# Patient Record
Sex: FEMALE | Race: BLACK OR AFRICAN AMERICAN | NOT HISPANIC OR LATINO | Employment: OTHER | URBAN - METROPOLITAN AREA
[De-identification: names, ages, dates, MRNs, and addresses within clinical notes are randomized per-mention and may not be internally consistent; named-entity substitution may affect disease eponyms.]

---

## 2017-01-04 RX ORDER — PIOGLITAZONEHYDROCHLORIDE 45 MG/1
TABLET ORAL
Qty: 90 TABLET | Refills: 2 | Status: SHIPPED | OUTPATIENT
Start: 2017-01-04 | End: 2017-03-27

## 2017-02-09 DIAGNOSIS — E11.9 DIABETES MELLITUS TYPE 2, NONINSULIN DEPENDENT (HCC): Primary | ICD-10-CM

## 2017-02-09 RX ORDER — GLIPIZIDE 10 MG/1
10 TABLET, FILM COATED, EXTENDED RELEASE ORAL DAILY
Qty: 90 TABLET | Refills: 3 | Status: SHIPPED | OUTPATIENT
Start: 2017-02-09 | End: 2018-01-15 | Stop reason: SDUPTHER

## 2017-02-09 RX ORDER — LEVOTHYROXINE SODIUM 0.07 MG/1
75 TABLET ORAL DAILY
Qty: 90 TABLET | Refills: 3 | Status: SHIPPED | OUTPATIENT
Start: 2017-02-09 | End: 2017-03-28 | Stop reason: DRUGHIGH

## 2017-03-27 ENCOUNTER — OFFICE VISIT (OUTPATIENT)
Dept: INTERNAL MEDICINE | Facility: CLINIC | Age: 67
End: 2017-03-27

## 2017-03-27 VITALS
DIASTOLIC BLOOD PRESSURE: 82 MMHG | WEIGHT: 293 LBS | SYSTOLIC BLOOD PRESSURE: 140 MMHG | HEART RATE: 67 BPM | HEIGHT: 69 IN | OXYGEN SATURATION: 98 % | BODY MASS INDEX: 43.4 KG/M2

## 2017-03-27 DIAGNOSIS — E11.9 DIABETES MELLITUS TYPE 2, NONINSULIN DEPENDENT (HCC): Primary | ICD-10-CM

## 2017-03-27 DIAGNOSIS — I10 ESSENTIAL HYPERTENSION: ICD-10-CM

## 2017-03-27 DIAGNOSIS — E03.9 ADULT HYPOTHYROIDISM: ICD-10-CM

## 2017-03-27 DIAGNOSIS — D64.9 CHRONIC ANEMIA: ICD-10-CM

## 2017-03-27 LAB
ALBUMIN SERPL-MCNC: 3.45 G/DL (ref 3.4–4.6)
ALBUMIN/GLOB SERPL: 0.8 G/DL
ALP SERPL-CCNC: 90 U/L (ref 46–116)
ALT SERPL W P-5'-P-CCNC: 17 U/L (ref 14–59)
ANION GAP SERPL CALCULATED.3IONS-SCNC: 8 MMOL/L
AST SERPL-CCNC: 15 U/L (ref 7–37)
BACTERIA UR QL AUTO: ABNORMAL /HPF
BASOPHILS # BLD AUTO: 0.01 10*3/MM3 (ref 0–0.2)
BASOPHILS NFR BLD AUTO: 0.2 % (ref 0–2)
BILIRUB SERPL-MCNC: 0.2 MG/DL (ref 0.2–1)
BILIRUB UR QL STRIP: NEGATIVE
BUN BLD-MCNC: 11 MG/DL (ref 6–22)
BUN/CREAT SERPL: 10.3 (ref 7–25)
CALCIUM SPEC-SCNC: 10.2 MG/DL (ref 8.6–10.5)
CHLORIDE SERPL-SCNC: 105 MMOL/L (ref 95–107)
CLARITY UR: CLEAR
CO2 SERPL-SCNC: 30 MMOL/L (ref 23–32)
COLOR UR: YELLOW
CREAT BLD-MCNC: 1.07 MG/DL (ref 0.55–1.02)
DEPRECATED RDW RBC AUTO: 44 FL (ref 37–54)
EOSINOPHIL # BLD AUTO: 0.09 10*3/MM3 (ref 0–0.7)
EOSINOPHIL NFR BLD AUTO: 1.6 % (ref 0–5)
ERYTHROCYTE [DISTWIDTH] IN BLOOD BY AUTOMATED COUNT: 13.4 % (ref 11.5–15)
GFR SERPL CREATININE-BSD FRML MDRD: 62 ML/MIN/1.73
GLOBULIN UR ELPH-MCNC: 4.2 GM/DL
GLUCOSE BLD-MCNC: 119 MG/DL (ref 70–100)
GLUCOSE UR STRIP-MCNC: NEGATIVE MG/DL
HBA1C MFR BLD: 7 % (ref 4–6)
HCT VFR BLD AUTO: 38.9 % (ref 34.1–44.9)
HGB BLD-MCNC: 11.8 G/DL (ref 11.2–15.7)
HGB UR QL STRIP.AUTO: ABNORMAL
HYALINE CASTS UR QL AUTO: ABNORMAL /LPF
KETONES UR QL STRIP: NEGATIVE
LEUKOCYTE ESTERASE UR QL STRIP.AUTO: ABNORMAL
LYMPHOCYTES # BLD AUTO: 1.69 10*3/MM3 (ref 0.8–7)
LYMPHOCYTES NFR BLD AUTO: 29.6 % (ref 10–60)
MCH RBC QN AUTO: 27.7 PG (ref 26–34)
MCHC RBC AUTO-ENTMCNC: 30.3 G/DL (ref 31–37)
MCV RBC AUTO: 91.3 FL (ref 80–100)
MONOCYTES # BLD AUTO: 0.42 10*3/MM3 (ref 0–1)
MONOCYTES NFR BLD AUTO: 7.4 % (ref 0–13)
MUCOUS THREADS URNS QL MICRO: ABNORMAL /HPF
NEUTROPHILS # BLD AUTO: 3.49 10*3/MM3 (ref 1–11)
NEUTROPHILS NFR BLD AUTO: 61.2 % (ref 30–85)
NITRITE UR QL STRIP: NEGATIVE
PH UR STRIP.AUTO: 5.5 [PH] (ref 5–8)
PLATELET # BLD AUTO: 319 10*3/MM3 (ref 150–450)
PMV BLD AUTO: 10.9 FL (ref 6–12)
POTASSIUM BLD-SCNC: 5 MMOL/L (ref 3.3–5.3)
PROT SERPL-MCNC: 7.6 G/DL (ref 6.3–8.4)
PROT UR QL STRIP: NEGATIVE
RBC # BLD AUTO: 4.26 10*6/MM3 (ref 3.93–5.22)
RBC # UR: ABNORMAL /HPF
REF LAB TEST METHOD: ABNORMAL
SODIUM BLD-SCNC: 143 MMOL/L (ref 136–145)
SP GR UR STRIP: >=1.03 (ref 1–1.03)
SQUAMOUS #/AREA URNS HPF: ABNORMAL /HPF
TSH SERPL DL<=0.05 MIU/L-ACNC: 0.2 MIU/ML (ref 0.4–4.2)
UROBILINOGEN UR QL STRIP: ABNORMAL
WBC NRBC COR # BLD: 5.7 10*3/MM3 (ref 5–10)
WBC UR QL AUTO: ABNORMAL /HPF

## 2017-03-27 PROCEDURE — 80053 COMPREHEN METABOLIC PANEL: CPT | Performed by: INTERNAL MEDICINE

## 2017-03-27 PROCEDURE — 84443 ASSAY THYROID STIM HORMONE: CPT | Performed by: INTERNAL MEDICINE

## 2017-03-27 PROCEDURE — 99213 OFFICE O/P EST LOW 20 MIN: CPT | Performed by: INTERNAL MEDICINE

## 2017-03-27 PROCEDURE — 85025 COMPLETE CBC W/AUTO DIFF WBC: CPT | Performed by: INTERNAL MEDICINE

## 2017-03-27 PROCEDURE — 36415 COLL VENOUS BLD VENIPUNCTURE: CPT | Performed by: INTERNAL MEDICINE

## 2017-03-27 PROCEDURE — 83036 HEMOGLOBIN GLYCOSYLATED A1C: CPT | Performed by: INTERNAL MEDICINE

## 2017-03-27 PROCEDURE — 81001 URINALYSIS AUTO W/SCOPE: CPT | Performed by: INTERNAL MEDICINE

## 2017-03-27 NOTE — PROGRESS NOTES
Newton Peoples is a 66 y.o. female.     History of Present Illness she is here today for follow-up of hypertension and diabetes mellitus.  Generally she has done well and her fasting blood sugar range is from 70s to 120s.  She has not had an eye exam in the last year however.  She denied any dizziness or focal neurologic symptoms.  There has been no chest pain, dyspnea on exertion, or PND.    Review of Systems   Constitutional: Negative for activity change, appetite change and fatigue.   Respiratory: Negative for cough, chest tightness, shortness of breath and wheezing.    Cardiovascular: Negative for chest pain, palpitations and leg swelling.   Gastrointestinal: Negative for abdominal distention, diarrhea, nausea and vomiting.   Genitourinary: Negative for flank pain and hematuria.   Musculoskeletal: Negative for arthralgias, back pain and gait problem.   Neurological: Negative for dizziness, syncope, facial asymmetry, speech difficulty, weakness, numbness and headaches.   Psychiatric/Behavioral: Negative for confusion and dysphoric mood. The patient is not nervous/anxious.      Objective   Physical Exam   Constitutional: She is oriented to person, place, and time. She appears well-developed and well-nourished. She is active. She does not appear ill.   Eyes: Conjunctivae are normal.   Neck: Carotid bruit is not present. Thyromegaly present. No thyroid mass present.       Cardiovascular: Normal rate, regular rhythm, S1 normal and S2 normal.  Exam reveals no S3 and no S4.    No murmur heard.  Pulses:       Posterior tibial pulses are 2+ on the right side, and 2+ on the left side.   Pulmonary/Chest: No tachypnea. No respiratory distress. She has no decreased breath sounds. She has no wheezes. She has no rhonchi. She has no rales.   Abdominal: Soft. Normal appearance and bowel sounds are normal. She exhibits no abdominal bruit and no mass. There is no hepatosplenomegaly. There is no tenderness.        Vascular Status -  Her exam exhibits no right foot edema. Her exam exhibits no left foot edema.  Neurological: She is alert and oriented to person, place, and time. Gait normal.   Psychiatric: She has a normal mood and affect. Her speech is normal and behavior is normal. Judgment and thought content normal. Cognition and memory are normal.       Assessment/Plan assessment #1 hypertension-systolic borderline today #2 diabetes mellitus-apparent good control    Plan #1 no change medication #2 CBC, CMP, urinalysis, TSH, hemoglobin A1c today.  Routine follow-up with me in 4 months.

## 2017-03-28 RX ORDER — LEVOTHYROXINE SODIUM 0.05 MG/1
50 TABLET ORAL DAILY
Qty: 90 TABLET | Refills: 1 | Status: SHIPPED | OUTPATIENT
Start: 2017-03-28 | End: 2017-07-17 | Stop reason: SDUPTHER

## 2017-04-24 RX ORDER — ATENOLOL 25 MG/1
TABLET ORAL
Qty: 90 TABLET | Refills: 1 | Status: SHIPPED | OUTPATIENT
Start: 2017-04-24 | End: 2017-10-24 | Stop reason: SDUPTHER

## 2017-04-27 DIAGNOSIS — E11.9 DIABETES MELLITUS TYPE 2, NONINSULIN DEPENDENT (HCC): Primary | ICD-10-CM

## 2017-05-19 ENCOUNTER — TELEPHONE (OUTPATIENT)
Dept: INTERNAL MEDICINE | Facility: CLINIC | Age: 67
End: 2017-05-19

## 2017-05-19 DIAGNOSIS — N39.0 URINARY TRACT INFECTION, SITE UNSPECIFIED: Primary | ICD-10-CM

## 2017-06-01 ENCOUNTER — TELEPHONE (OUTPATIENT)
Dept: INTERNAL MEDICINE | Facility: CLINIC | Age: 67
End: 2017-06-01

## 2017-06-01 RX ORDER — LISINOPRIL 40 MG/1
TABLET ORAL
Qty: 90 TABLET | Refills: 3 | Status: SHIPPED | OUTPATIENT
Start: 2017-06-01 | End: 2018-08-21 | Stop reason: SDUPTHER

## 2017-06-01 RX ORDER — OXYBUTYNIN CHLORIDE 5 MG/1
TABLET ORAL
Qty: 90 TABLET | Refills: 3 | Status: SHIPPED | OUTPATIENT
Start: 2017-06-01 | End: 2018-08-21 | Stop reason: SDUPTHER

## 2017-06-01 NOTE — TELEPHONE ENCOUNTER
----- Message from Alva Mark sent at 6/1/2017  3:52 PM EDT -----  Contact: pt - Dr Olivares's pt - RE: Rx refill  Pt calling and would like a 10 day supply called to pharmacy. Pt informs was on vacation and does not have enough Rx to last til renewal. Could you please call Rx to pt's pharmacy? Please advise. Thanks    oxybutynin (DITROPAN) 5 MG tablet 90 tablet 2 7/5/2016      Sig: TAKE 1 TABLET AT BEDTIME    lisinopril (PRINIVIL,ZESTRIL) 40 MG tablet 90 tablet 2 7/5/2016      Sig: TAKE 1 TABLET DAILY    [facility] [address] [place] [address] - [address] [place] [address] AT [address] [place] [address] - [phone]  - [phone] (Phone)  [phone] (Fax)    Pt # [phone]

## 2017-06-05 ENCOUNTER — LAB (OUTPATIENT)
Dept: INTERNAL MEDICINE | Facility: CLINIC | Age: 67
End: 2017-06-05

## 2017-06-05 DIAGNOSIS — N39.0 URINARY TRACT INFECTION, SITE UNSPECIFIED: ICD-10-CM

## 2017-06-09 ENCOUNTER — TELEPHONE (OUTPATIENT)
Dept: INTERNAL MEDICINE | Facility: CLINIC | Age: 67
End: 2017-06-09

## 2017-06-09 LAB
BACTERIA UR CULT: ABNORMAL
Lab: ABNORMAL
SUSCEPTIBILITY TESTING: ABNORMAL

## 2017-06-09 RX ORDER — SULFAMETHOXAZOLE AND TRIMETHOPRIM 800; 160 MG/1; MG/1
1 TABLET ORAL 2 TIMES DAILY
Qty: 10 TABLET | Refills: 0 | Status: SHIPPED | OUTPATIENT
Start: 2017-06-09 | End: 2017-08-21

## 2017-06-09 NOTE — TELEPHONE ENCOUNTER
----- Message from Ryan Olivares Jr., MD sent at 6/9/2017  8:48 AM EDT -----  Urine culture is positive.  Bactrim double strength 1 by mouth twice a day for 5 days.

## 2017-07-17 DIAGNOSIS — E03.9 ADULT HYPOTHYROIDISM: Primary | ICD-10-CM

## 2017-07-17 RX ORDER — LEVOTHYROXINE SODIUM 0.05 MG/1
50 TABLET ORAL DAILY
Qty: 90 TABLET | Refills: 3 | Status: SHIPPED | OUTPATIENT
Start: 2017-07-17 | End: 2018-09-10 | Stop reason: SDUPTHER

## 2017-07-18 ENCOUNTER — TELEPHONE (OUTPATIENT)
Dept: INTERNAL MEDICINE | Facility: CLINIC | Age: 67
End: 2017-07-18

## 2017-07-18 NOTE — TELEPHONE ENCOUNTER
----- Message from Latoya Shepard sent at 7/17/2017  3:43 PM EDT -----  Contact: pt  Pt calling would like a call back. She needs help with the directions on her medication     Doxepin Hydrochloride 5% cream.     I did not see this in her chart. She says she got it through mail order. Please advise.     #[phone]

## 2017-07-18 NOTE — TELEPHONE ENCOUNTER
The Doxepin is being used for her lower back pain, hip pain and leg pain.    There is a tube of medication and a pump of medication.  There are steps to follow.  She does not understand the steps of using the tube and pump of medications.

## 2017-07-20 RX ORDER — TRAMADOL HYDROCHLORIDE 50 MG/1
50 TABLET ORAL 2 TIMES DAILY
Qty: 45 TABLET | Refills: 0 | Status: SHIPPED | OUTPATIENT
Start: 2017-07-20 | End: 2018-02-07 | Stop reason: SDUPTHER

## 2017-08-21 ENCOUNTER — OFFICE VISIT (OUTPATIENT)
Dept: INTERNAL MEDICINE | Facility: CLINIC | Age: 67
End: 2017-08-21

## 2017-08-21 VITALS
WEIGHT: 293 LBS | SYSTOLIC BLOOD PRESSURE: 134 MMHG | HEIGHT: 69 IN | BODY MASS INDEX: 43.4 KG/M2 | DIASTOLIC BLOOD PRESSURE: 76 MMHG

## 2017-08-21 DIAGNOSIS — Z12.31 ENCOUNTER FOR MAMMOGRAM TO ESTABLISH BASELINE MAMMOGRAM: ICD-10-CM

## 2017-08-21 DIAGNOSIS — I10 ESSENTIAL HYPERTENSION: ICD-10-CM

## 2017-08-21 DIAGNOSIS — M13.0 HYPERTROPHIC POLYARTHRITIS: ICD-10-CM

## 2017-08-21 DIAGNOSIS — E03.9 ADULT HYPOTHYROIDISM: ICD-10-CM

## 2017-08-21 DIAGNOSIS — E04.0 DIFFUSE GOITER: Primary | ICD-10-CM

## 2017-08-21 DIAGNOSIS — D64.9 CHRONIC ANEMIA: ICD-10-CM

## 2017-08-21 DIAGNOSIS — E11.9 DIABETES MELLITUS TYPE 2, NONINSULIN DEPENDENT (HCC): ICD-10-CM

## 2017-08-21 LAB
HBA1C MFR BLD: 6.3 % (ref 4–6)
TSH SERPL-ACNC: 0.41 MIU/ML (ref 0.27–4.2)

## 2017-08-21 PROCEDURE — 99214 OFFICE O/P EST MOD 30 MIN: CPT | Performed by: INTERNAL MEDICINE

## 2017-08-21 PROCEDURE — 83036 HEMOGLOBIN GLYCOSYLATED A1C: CPT | Performed by: INTERNAL MEDICINE

## 2017-08-21 RX ORDER — PIOGLITAZONEHYDROCHLORIDE 45 MG/1
TABLET ORAL
COMMUNITY
Start: 2017-07-03 | End: 2017-10-01 | Stop reason: SDUPTHER

## 2017-08-21 RX ORDER — DOXEPIN HYDROCHLORIDE 50 MG/G
CREAM TOPICAL
COMMUNITY
Start: 2017-07-13 | End: 2018-06-25

## 2017-08-21 NOTE — PROGRESS NOTES
Subjective   Terra Peoples is a 67 y.o. female.     History of Present Illness she is here today for her yearly follow-up which includes hypertension, diabetes mellitus, generalized osteoarthritis and hypothyroidism with goiter.  Overall she has felt well.  She denies any dizziness, focal neurologic symptoms, PND, dyspnea on exertion, chest pain, or swelling in the legs.  Her nocturia has improved with the use of oxybutynin.  She uses topical Voltaren and baclofen for diffuse osteoarthritis with good relief.  Her appetite is good.  Her review systems is otherwise negative.        Review of Systems   Constitutional: Negative for activity change, appetite change, fatigue and unexpected weight change.   HENT: Negative for trouble swallowing.    Respiratory: Negative for cough, chest tightness, shortness of breath and wheezing.    Cardiovascular: Negative for chest pain, palpitations and leg swelling.   Gastrointestinal: Negative for abdominal pain, anal bleeding, blood in stool, constipation, diarrhea, nausea and vomiting.   Genitourinary: Negative for difficulty urinating, dysuria, flank pain, frequency, hematuria and vaginal bleeding.   Musculoskeletal: Positive for arthralgias and back pain. Negative for gait problem.   Neurological: Negative for dizziness, syncope, facial asymmetry, speech difficulty, weakness, numbness and headaches.   Psychiatric/Behavioral: Negative for confusion and dysphoric mood. The patient is not nervous/anxious.        Objective   Physical Exam   Constitutional: She is oriented to person, place, and time. Vital signs are normal. She appears well-developed and well-nourished. She is active. She does not appear ill.   Eyes: Conjunctivae are normal.   Fundoscopic exam:       The right eye shows no AV nicking, no exudate and no hemorrhage.        The left eye shows no AV nicking, no exudate and no hemorrhage.   Neck: Carotid bruit is not present. No thyroid mass and no thyromegaly  present.       Cardiovascular: Normal rate, regular rhythm, S1 normal and S2 normal.  Exam reveals no S3 and no S4.    No murmur heard.  Pulses:       Posterior tibial pulses are 2+ on the right side, and 2+ on the left side.   Pulmonary/Chest: No tachypnea. No respiratory distress. She has no decreased breath sounds. She has no wheezes. She has no rhonchi. She has no rales.   Abdominal: Soft. Normal appearance and bowel sounds are normal. She exhibits no abdominal bruit and no mass. There is no hepatosplenomegaly. There is no tenderness.       Vascular Status -  Her exam exhibits no right foot edema. Her exam exhibits no left foot edema.  Neurological: She is alert and oriented to person, place, and time. Gait normal.   Reflex Scores:       Bicep reflexes are 2+ on the right side.  Psychiatric: She has a normal mood and affect. Her speech is normal and behavior is normal. Judgment and thought content normal. Cognition and memory are normal.       Assessment/Plan June lab showed a hematocrit of 38.9 hemoglobin 11.8 MCV 91.3.  TSH 0.2.  Hemoglobin A1c 7.0.  Urinalysis and CMP normal.    Assessment #1 diabetes mellitus-good control #2 hypothyroidism-over supplemented and now she is on a reduced dose #3 osteoarthritis-good control with topical preparation    Plan #1 no change medication #2 TSH and hemoglobin A1c today #3 mammogram #4 she is advised to see her ophthalmologist for yearly exam.  Routine follow-up with me in 6 months.

## 2017-09-06 ENCOUNTER — TELEPHONE (OUTPATIENT)
Dept: INTERNAL MEDICINE | Facility: CLINIC | Age: 67
End: 2017-09-06

## 2017-09-11 ENCOUNTER — HOSPITAL ENCOUNTER (OUTPATIENT)
Dept: MAMMOGRAPHY | Facility: HOSPITAL | Age: 67
Discharge: HOME | End: 2017-09-11
Attending: INTERNAL MEDICINE | Admitting: INTERNAL MEDICINE

## 2017-09-11 DIAGNOSIS — Z12.31 ENCOUNTER FOR MAMMOGRAM TO ESTABLISH BASELINE MAMMOGRAM: ICD-10-CM

## 2017-09-11 PROCEDURE — G0202 SCR MAMMO BI INCL CAD: HCPCS

## 2017-10-02 RX ORDER — PIOGLITAZONEHYDROCHLORIDE 45 MG/1
TABLET ORAL
Qty: 90 TABLET | Refills: 2 | Status: SHIPPED | OUTPATIENT
Start: 2017-10-02 | End: 2018-06-27 | Stop reason: SDUPTHER

## 2017-10-24 RX ORDER — ATENOLOL 25 MG/1
TABLET ORAL
Qty: 90 TABLET | Refills: 3 | Status: SHIPPED | OUTPATIENT
Start: 2017-10-24 | End: 2018-06-25

## 2018-01-15 DIAGNOSIS — E11.9 DIABETES MELLITUS TYPE 2, NONINSULIN DEPENDENT (HCC): ICD-10-CM

## 2018-01-15 RX ORDER — GLIPIZIDE 10 MG/1
TABLET, FILM COATED, EXTENDED RELEASE ORAL
Qty: 90 TABLET | Refills: 3 | Status: SHIPPED | OUTPATIENT
Start: 2018-01-15 | End: 2019-01-31 | Stop reason: SDUPTHER

## 2018-02-07 DIAGNOSIS — R52 PAIN: Primary | ICD-10-CM

## 2018-02-07 RX ORDER — TRAMADOL HYDROCHLORIDE 50 MG/1
50 TABLET ORAL DAILY
Qty: 40 TABLET | Refills: 0 | Status: SHIPPED | OUTPATIENT
Start: 2018-02-07 | End: 2018-06-25

## 2018-02-07 NOTE — TELEPHONE ENCOUNTER
Called pt, she wants Tramadol called in to [facility] since it's one month supply. Canceled the fax to express and called in to local for 40 tab  NR.

## 2018-02-19 ENCOUNTER — OFFICE VISIT (OUTPATIENT)
Dept: INTERNAL MEDICINE | Facility: CLINIC | Age: 68
End: 2018-02-19

## 2018-02-19 VITALS
BODY MASS INDEX: 43.4 KG/M2 | SYSTOLIC BLOOD PRESSURE: 142 MMHG | WEIGHT: 293 LBS | DIASTOLIC BLOOD PRESSURE: 78 MMHG | HEIGHT: 69 IN

## 2018-02-19 DIAGNOSIS — I10 ESSENTIAL HYPERTENSION: ICD-10-CM

## 2018-02-19 DIAGNOSIS — E11.9 DIABETES MELLITUS TYPE 2, NONINSULIN DEPENDENT (HCC): ICD-10-CM

## 2018-02-19 DIAGNOSIS — M25.562 PAIN IN BOTH KNEES, UNSPECIFIED CHRONICITY: Primary | ICD-10-CM

## 2018-02-19 DIAGNOSIS — E03.9 ADULT HYPOTHYROIDISM: ICD-10-CM

## 2018-02-19 DIAGNOSIS — M25.561 PAIN IN BOTH KNEES, UNSPECIFIED CHRONICITY: Primary | ICD-10-CM

## 2018-02-19 DIAGNOSIS — D64.9 CHRONIC ANEMIA: ICD-10-CM

## 2018-02-19 PROBLEM — G62.9 NEUROPATHY: Status: ACTIVE | Noted: 2018-02-19

## 2018-02-19 LAB
ALBUMIN SERPL-MCNC: 3.9 G/DL (ref 3.5–5.2)
ALBUMIN/GLOB SERPL: 1 G/DL
ALP SERPL-CCNC: 94 U/L (ref 39–117)
ALT SERPL W P-5'-P-CCNC: 12 U/L (ref 1–33)
ANION GAP SERPL CALCULATED.3IONS-SCNC: 12.3 MMOL/L
AST SERPL-CCNC: 11 U/L (ref 1–32)
BASOPHILS # BLD AUTO: 0.02 10*3/MM3 (ref 0–0.2)
BASOPHILS NFR BLD AUTO: 0.3 % (ref 0–1.5)
BILIRUB SERPL-MCNC: <0.2 MG/DL (ref 0.1–1.2)
BILIRUB UR QL STRIP: NEGATIVE
BUN BLD-MCNC: 15 MG/DL (ref 8–23)
BUN/CREAT SERPL: 17.4 (ref 7–25)
CALCIUM SPEC-SCNC: 10.1 MG/DL (ref 8.6–10.5)
CHLORIDE SERPL-SCNC: 103 MMOL/L (ref 98–107)
CLARITY UR: CLEAR
CO2 SERPL-SCNC: 28.7 MMOL/L (ref 22–29)
COLOR UR: YELLOW
CREAT BLD-MCNC: 0.86 MG/DL (ref 0.57–1)
DIFFERENTIAL COMMENT: NORMAL
EOSINOPHIL # BLD AUTO: 0.1 10*3/MM3 (ref 0–0.7)
EOSINOPHIL # BLD AUTO: 1.5 % (ref 0.3–6.2)
ERYTHROCYTE [DISTWIDTH] IN BLOOD BY AUTOMATED COUNT: 14.2 % (ref 11.7–13)
GFR SERPL CREATININE-BSD FRML MDRD: 80 ML/MIN/1.73
GLOBULIN UR ELPH-MCNC: 3.9 GM/DL
GLUCOSE BLD-MCNC: 53 MG/DL (ref 65–99)
GLUCOSE UR STRIP-MCNC: NEGATIVE MG/DL
HBA1C MFR BLD: 6.41 % (ref 4.8–5.6)
HCT VFR BLD AUTO: 38.5 % (ref 35.6–45.5)
HGB BLD-MCNC: 11.4 G/DL (ref 11.9–15.5)
HGB UR QL STRIP.AUTO: NEGATIVE
IMM GRANULOCYTES # BLD: 0 10*3/MM3 (ref 0–0.03)
IMM GRANULOCYTES NFR BLD: 0 % (ref 0–0.5)
KETONES UR QL STRIP: ABNORMAL
LABORATORY COMMENT REPORT: NORMAL
LEUKOCYTE ESTERASE UR QL STRIP.AUTO: NEGATIVE
LYMPHOCYTES # BLD AUTO: 2.27 10*3/MM3 (ref 0.9–4.8)
LYMPHOCYTES NFR BLD AUTO: 34.7 % (ref 19.6–45.3)
MCH RBC QN AUTO: 27.4 PG (ref 26.9–32)
MCHC RBC AUTO-ENTMCNC: 29.6 G/DL (ref 32.4–36.3)
MCV RBC AUTO: 92.5 FL (ref 80.5–98.2)
MONOCYTES # BLD AUTO: 0.42 10*3/MM3 (ref 0.2–1.2)
MONOCYTES NFR BLD AUTO: 6.4 % (ref 5–12)
NEUTROPHILS # BLD AUTO: 3.72 10*3/MM3 (ref 1.9–8.1)
NEUTROPHILS NFR BLD AUTO: 56.9 % (ref 42.7–76)
NITRITE UR QL STRIP: NEGATIVE
NRBC BLD AUTO-RTO: 0.3 /100 WBC (ref 0–0)
PH UR STRIP.AUTO: 5.5 [PH] (ref 5–8)
PLATELET # BLD AUTO: 366 10*3/MM3 (ref 140–500)
PLT COMMENT: NORMAL
POTASSIUM BLD-SCNC: 4.3 MMOL/L (ref 3.5–5.2)
PROT SERPL-MCNC: 7.8 G/DL (ref 6–8.5)
PROT UR QL STRIP: NEGATIVE
RBC # BLD AUTO: 4.16 10*6/MM3 (ref 3.9–5.2)
SODIUM BLD-SCNC: 144 MMOL/L (ref 136–145)
SP GR UR STRIP: 1.02 (ref 1–1.03)
UROBILINOGEN UR QL STRIP: ABNORMAL
WBC # BLD AUTO: 6.54 10*3/MM3 (ref 4.5–10.7)

## 2018-02-19 PROCEDURE — 99214 OFFICE O/P EST MOD 30 MIN: CPT | Performed by: INTERNAL MEDICINE

## 2018-02-19 PROCEDURE — 81003 URINALYSIS AUTO W/O SCOPE: CPT | Performed by: INTERNAL MEDICINE

## 2018-02-19 PROCEDURE — 80053 COMPREHEN METABOLIC PANEL: CPT | Performed by: INTERNAL MEDICINE

## 2018-02-19 RX ORDER — LIDOCAINE 50 MG/G
1 PATCH TOPICAL EVERY 24 HOURS
Qty: 10 PATCH | Refills: 5 | Status: SHIPPED | OUTPATIENT
Start: 2018-02-19 | End: 2018-05-08 | Stop reason: SDUPTHER

## 2018-02-19 NOTE — PROGRESS NOTES
Subjective   Terra Peoples is a 67 y.o. female.     History of Present Illness she is here today for her yearly follow-up which includes hypothyroidism with large goiter, hypertension, diabetes mellitus, and osteoarthritis of the knees.  Overall she has done well.  She does have stable one half mile dyspnea unchanged over the last year.  She denies any PND or chest tightness.  There has been no swelling in the ankles.  She denies any dizziness, syncope, or focal neurologic episodes.  She does have 3 per night nocturia but denies dysuria or daytime urinary frequency.  Her main subjective complaint is that of chronic knee pain for which he uses diclofenac topical and tramadol tablets.  The remainder of her review systems is negative.        Review of Systems   Constitutional: Negative for activity change, appetite change and fatigue.   HENT: Negative for trouble swallowing.    Respiratory: Negative for cough, shortness of breath and wheezing.    Cardiovascular: Negative for chest pain, palpitations and leg swelling.   Gastrointestinal: Negative for abdominal pain, anal bleeding, blood in stool, constipation, diarrhea, nausea and vomiting.   Genitourinary: Negative for dysuria, flank pain and hematuria.   Musculoskeletal: Positive for arthralgias. Negative for back pain and gait problem.   Neurological: Negative for dizziness, syncope, facial asymmetry, speech difficulty, weakness, numbness and headaches.   Psychiatric/Behavioral: Negative for dysphoric mood. The patient is not nervous/anxious.        Objective   Physical Exam   Constitutional: She is oriented to person, place, and time. She appears well-developed and well-nourished. She is active. She does not appear ill.   Eyes: Conjunctivae are normal.   Fundoscopic exam:       The right eye shows no AV nicking, no exudate and no hemorrhage.        The left eye shows no AV nicking, no exudate and no hemorrhage.   Neck: Carotid bruit is not present. Thyromegaly  present. No thyroid mass present.       Cardiovascular: Normal rate, regular rhythm, S1 normal and S2 normal.  Exam reveals no S3 and no S4.    No murmur heard.  Pulses:       Dorsalis pedis pulses are 2+ on the right side, and 2+ on the left side.   Pulmonary/Chest: No tachypnea. No respiratory distress. She has no decreased breath sounds. She has no wheezes. She has no rhonchi. She has no rales.   Abdominal: Soft. Normal appearance and bowel sounds are normal. She exhibits no abdominal bruit and no mass. There is no hepatosplenomegaly. There is no tenderness.       Vascular Status -  Her exam exhibits no right foot edema. Her exam exhibits no left foot edema.  Neurological: She is alert and oriented to person, place, and time. Gait normal.   Reflex Scores:       Bicep reflexes are 1+ on the right side.  Psychiatric: She has a normal mood and affect. Her speech is normal and behavior is normal. Judgment and thought content normal. Cognition and memory are normal.       Assessment/Plan in August hemoglobin A1c was 6.3.  TSH was 0.4    Assessment #1 hypertension-systolic up today #2 diabetes mellitus-she has had good control over the last year #3 large goiter-euthyroid chemically #4 osteoarthritis of the knees-her primary subjective problem    Plan #1 attempt to obtain insurance authorization with lidocaine patches to be used on the knees daily when necessary #2 CBC, CMP, urinalysis, hemoglobin A1c today.  Routine follow-up with me in 6 months.

## 2018-02-23 ENCOUNTER — TELEPHONE (OUTPATIENT)
Dept: INTERNAL MEDICINE | Facility: CLINIC | Age: 68
End: 2018-02-23

## 2018-02-23 NOTE — TELEPHONE ENCOUNTER
Called Pharmacy. They received RX but Medicare is not covering med and a PA is needed.     Left message for patient and advised the hold up is a PA but will attempt to get one.

## 2018-02-23 NOTE — TELEPHONE ENCOUNTER
----- Message from Swati Perez sent at 2/23/2018  3:00 PM EST -----  Contact: Patient   Patient called stating pharmacy says they have no record of medication being called in this week, and would like her med before weekend    lidocaine (LIDODERM) 5%.  Please advise.      Patient:  [phone]    Pharmacy:  [facility] [address] [place] [address] AT [address] [place] [address] [phone] [place] [phone]

## 2018-02-28 ENCOUNTER — TELEPHONE (OUTPATIENT)
Dept: INTERNAL MEDICINE | Facility: CLINIC | Age: 68
End: 2018-02-28

## 2018-03-07 ENCOUNTER — PRIOR AUTHORIZATION (OUTPATIENT)
Dept: INTERNAL MEDICINE | Facility: CLINIC | Age: 68
End: 2018-03-07

## 2018-03-07 NOTE — TELEPHONE ENCOUNTER
[facility] on [address] [place] was informed Lidocaine patches was apprvied form 1/29/18 through 2/28/19.

## 2018-03-22 DIAGNOSIS — R52 PAIN: ICD-10-CM

## 2018-03-22 RX ORDER — TRAMADOL HYDROCHLORIDE 50 MG/1
TABLET ORAL
Qty: 40 TABLET | Refills: 0 | OUTPATIENT
Start: 2018-03-22

## 2018-05-08 DIAGNOSIS — M25.562 PAIN IN BOTH KNEES, UNSPECIFIED CHRONICITY: ICD-10-CM

## 2018-05-08 DIAGNOSIS — M25.561 PAIN IN BOTH KNEES, UNSPECIFIED CHRONICITY: ICD-10-CM

## 2018-05-08 RX ORDER — LIDOCAINE 50 MG/G
PATCH TOPICAL
Qty: 10 PATCH | Refills: 1 | Status: SHIPPED | OUTPATIENT
Start: 2018-05-08 | End: 2018-05-29 | Stop reason: SDUPTHER

## 2018-05-29 DIAGNOSIS — M25.561 PAIN IN BOTH KNEES, UNSPECIFIED CHRONICITY: ICD-10-CM

## 2018-05-29 DIAGNOSIS — M25.562 PAIN IN BOTH KNEES, UNSPECIFIED CHRONICITY: ICD-10-CM

## 2018-05-29 RX ORDER — LIDOCAINE 50 MG/G
PATCH TOPICAL
Qty: 10 PATCH | Refills: 0 | Status: SHIPPED | OUTPATIENT
Start: 2018-05-29 | End: 2018-06-11 | Stop reason: SDUPTHER

## 2018-06-05 ENCOUNTER — TELEPHONE (OUTPATIENT)
Dept: INTERNAL MEDICINE | Facility: CLINIC | Age: 68
End: 2018-06-05

## 2018-06-05 NOTE — TELEPHONE ENCOUNTER
Patient walked into the office at 3:55 for the acute clinic. Pt was given list of multiple express and urgent care clinics in the [facility].

## 2018-06-11 DIAGNOSIS — M25.561 PAIN IN BOTH KNEES, UNSPECIFIED CHRONICITY: ICD-10-CM

## 2018-06-11 DIAGNOSIS — M25.562 PAIN IN BOTH KNEES, UNSPECIFIED CHRONICITY: ICD-10-CM

## 2018-06-11 RX ORDER — LIDOCAINE 50 MG/G
PATCH TOPICAL
Qty: 10 PATCH | Refills: 0 | Status: SHIPPED | OUTPATIENT
Start: 2018-06-11 | End: 2018-06-20 | Stop reason: SDUPTHER

## 2018-06-20 DIAGNOSIS — M25.562 PAIN IN BOTH KNEES, UNSPECIFIED CHRONICITY: ICD-10-CM

## 2018-06-20 DIAGNOSIS — M25.561 PAIN IN BOTH KNEES, UNSPECIFIED CHRONICITY: ICD-10-CM

## 2018-06-21 RX ORDER — LIDOCAINE 50 MG/G
PATCH TOPICAL
Qty: 10 PATCH | Refills: 0 | Status: SHIPPED | OUTPATIENT
Start: 2018-06-21 | End: 2018-07-13 | Stop reason: SDUPTHER

## 2018-06-25 ENCOUNTER — OFFICE VISIT (OUTPATIENT)
Dept: INTERNAL MEDICINE | Facility: CLINIC | Age: 68
End: 2018-06-25

## 2018-06-25 VITALS
BODY MASS INDEX: 43.4 KG/M2 | WEIGHT: 293 LBS | SYSTOLIC BLOOD PRESSURE: 136 MMHG | DIASTOLIC BLOOD PRESSURE: 82 MMHG | HEIGHT: 69 IN

## 2018-06-25 DIAGNOSIS — R06.09 DYSPNEA ON EXERTION: ICD-10-CM

## 2018-06-25 DIAGNOSIS — I10 ESSENTIAL HYPERTENSION: Primary | ICD-10-CM

## 2018-06-25 DIAGNOSIS — I87.2 VENOUS INSUFFICIENCY: ICD-10-CM

## 2018-06-25 DIAGNOSIS — E11.9 DIABETES MELLITUS TYPE 2, NONINSULIN DEPENDENT (HCC): ICD-10-CM

## 2018-06-25 PROCEDURE — 99213 OFFICE O/P EST LOW 20 MIN: CPT | Performed by: INTERNAL MEDICINE

## 2018-06-25 NOTE — PROGRESS NOTES
Subjective   Terra Peoples is a 68 y.o. female.     History of Present Illness she was seen in the emergency room 2 weeks ago after a 2 week history of edema involving both legs.  She had experienced some dyspnea on exertion and wheezing.  Lab work and a chest x-ray were apparently normal by her history.  Her dyspnea has since resolved.  She denies any PND or chest pain.  She denies any previous similar episode of swelling in the legs.  There was no associated pain.  She was prescribed stockings but she has not started to use them yet.        Review of Systems   Constitutional: Negative for activity change, appetite change and fatigue.   Respiratory: Negative for cough, chest tightness, shortness of breath and wheezing.    Cardiovascular: Positive for leg swelling. Negative for chest pain and palpitations.   Gastrointestinal: Negative for abdominal pain.   Genitourinary: Negative for flank pain and hematuria.   Neurological: Negative for dizziness and weakness.       Objective   Physical Exam   Constitutional: She is oriented to person, place, and time. Vital signs are normal. She appears well-developed and well-nourished. She is active. She does not appear ill.   Cardiovascular: Normal rate, regular rhythm, S1 normal and S2 normal.  Exam reveals no S3 and no S4.    Murmur heard.   Systolic murmur is present with a grade of 2/6   Pulses:       Dorsalis pedis pulses are 2+ on the right side, and 2+ on the left side.   There is a grade 2 to 3/6 systolic ejection murmur heard best at the base radiating down the left sternal border   Pulmonary/Chest: No tachypnea. No respiratory distress. She has no decreased breath sounds. She has no wheezes. She has no rhonchi. She has no rales.   Abdominal: Soft. Normal appearance and bowel sounds are normal. She exhibits no mass. There is no hepatosplenomegaly. There is no tenderness.     Vascular Status -  Her right foot exhibits abnormal foot edema (Trace to 1+ pretibial  edema). Her left foot exhibits abnormal foot edema (Trace to 1+ pretibial edema).  Neurological: She is oriented to person, place, and time. Gait normal.   Psychiatric: She has a normal mood and affect. Her speech is normal and behavior is normal. Judgment and thought content normal. Cognition and memory are normal.         Assessment/Plan February lab showed normal CMP and urinalysis.  Hemoglobin A1c 6.4.  Hematocrit 30.5 hemoglobin 11.4 MCV 92.5    Assessment #1 leg edema-likely chronic venous insufficiency but we must make certain that this is not an early sign of congestive heart failure.  This was discussed with her.    Plan #1 no change medication #2 she will wear stockings daily #3 2-D echocardiogram.  Routine follow-up with me in one month.

## 2018-06-27 RX ORDER — PIOGLITAZONEHYDROCHLORIDE 45 MG/1
TABLET ORAL
Qty: 90 TABLET | Refills: 2 | Status: SHIPPED | OUTPATIENT
Start: 2018-06-27 | End: 2019-01-31 | Stop reason: SDUPTHER

## 2018-07-09 ENCOUNTER — HOSPITAL ENCOUNTER (OUTPATIENT)
Dept: CARDIOLOGY | Facility: HOSPITAL | Age: 68
Discharge: HOME | End: 2018-07-09
Attending: INTERNAL MEDICINE | Admitting: INTERNAL MEDICINE

## 2018-07-09 VITALS
WEIGHT: 293 LBS | SYSTOLIC BLOOD PRESSURE: 187 MMHG | DIASTOLIC BLOOD PRESSURE: 91 MMHG | HEART RATE: 65 BPM | HEIGHT: 69 IN | BODY MASS INDEX: 43.4 KG/M2

## 2018-07-09 DIAGNOSIS — R06.09 DYSPNEA ON EXERTION: ICD-10-CM

## 2018-07-09 LAB
BH CV ECHO MEAS - ACS: 2.3 CM
BH CV ECHO MEAS - AO MAX PG (FULL): 3.2 MMHG
BH CV ECHO MEAS - AO MAX PG: 8.2 MMHG
BH CV ECHO MEAS - AO MEAN PG (FULL): 1 MMHG
BH CV ECHO MEAS - AO MEAN PG: 4 MMHG
BH CV ECHO MEAS - AO ROOT AREA (BSA CORRECTED): 1.4
BH CV ECHO MEAS - AO ROOT AREA: 9.6 CM^2
BH CV ECHO MEAS - AO ROOT DIAM: 3.5 CM
BH CV ECHO MEAS - AO V2 MAX: 143 CM/SEC
BH CV ECHO MEAS - AO V2 MEAN: 92.1 CM/SEC
BH CV ECHO MEAS - AO V2 VTI: 29 CM
BH CV ECHO MEAS - AVA(I,A): 3.6 CM^2
BH CV ECHO MEAS - AVA(I,D): 3.6 CM^2
BH CV ECHO MEAS - AVA(V,A): 3 CM^2
BH CV ECHO MEAS - AVA(V,D): 3 CM^2
BH CV ECHO MEAS - BSA(HAYCOCK): 2.7 M^2
BH CV ECHO MEAS - BSA: 2.5 M^2
BH CV ECHO MEAS - BZI_BMI: 47.3 KILOGRAMS/M^2
BH CV ECHO MEAS - BZI_METRIC_HEIGHT: 175.3 CM
BH CV ECHO MEAS - BZI_METRIC_WEIGHT: 145.2 KG
BH CV ECHO MEAS - CONTRAST EF (2CH): 73.8 ML/M^2
BH CV ECHO MEAS - CONTRAST EF 4CH: 77.1 ML/M^2
BH CV ECHO MEAS - EDV(CUBED): 85.2 ML
BH CV ECHO MEAS - EDV(MOD-SP2): 84 ML
BH CV ECHO MEAS - EDV(MOD-SP4): 109 ML
BH CV ECHO MEAS - EDV(TEICH): 87.7 ML
BH CV ECHO MEAS - EF(CUBED): 76.9 %
BH CV ECHO MEAS - EF(MOD-BP): 75 %
BH CV ECHO MEAS - EF(MOD-SP2): 73.8 %
BH CV ECHO MEAS - EF(MOD-SP4): 77.1 %
BH CV ECHO MEAS - EF(TEICH): 69.2 %
BH CV ECHO MEAS - ESV(CUBED): 19.7 ML
BH CV ECHO MEAS - ESV(MOD-SP2): 22 ML
BH CV ECHO MEAS - ESV(MOD-SP4): 25 ML
BH CV ECHO MEAS - ESV(TEICH): 27 ML
BH CV ECHO MEAS - FS: 38.6 %
BH CV ECHO MEAS - IVS/LVPW: 1.3
BH CV ECHO MEAS - IVSD: 1.5 CM
BH CV ECHO MEAS - LAT PEAK E' VEL: 10.9 CM/SEC
BH CV ECHO MEAS - LV DIASTOLIC VOL/BSA (35-75): 43.2 ML/M^2
BH CV ECHO MEAS - LV MASS(C)D: 227.5 GRAMS
BH CV ECHO MEAS - LV MASS(C)DI: 90.2 GRAMS/M^2
BH CV ECHO MEAS - LV MAX PG: 5 MMHG
BH CV ECHO MEAS - LV MEAN PG: 3 MMHG
BH CV ECHO MEAS - LV SYSTOLIC VOL/BSA (12-30): 9.9 ML/M^2
BH CV ECHO MEAS - LV V1 MAX: 112 CM/SEC
BH CV ECHO MEAS - LV V1 MEAN: 74.5 CM/SEC
BH CV ECHO MEAS - LV V1 VTI: 27.5 CM
BH CV ECHO MEAS - LVIDD: 4.4 CM
BH CV ECHO MEAS - LVIDS: 2.7 CM
BH CV ECHO MEAS - LVLD AP2: 6.9 CM
BH CV ECHO MEAS - LVLD AP4: 7.1 CM
BH CV ECHO MEAS - LVLS AP2: 5.7 CM
BH CV ECHO MEAS - LVLS AP4: 5.2 CM
BH CV ECHO MEAS - LVOT AREA (M): 3.8 CM^2
BH CV ECHO MEAS - LVOT AREA: 3.8 CM^2
BH CV ECHO MEAS - LVOT DIAM: 2.2 CM
BH CV ECHO MEAS - LVPWD: 1.2 CM
BH CV ECHO MEAS - MED PEAK E' VEL: 9 CM/SEC
BH CV ECHO MEAS - MV A DUR: 0.19 SEC
BH CV ECHO MEAS - MV A MAX VEL: 105 CM/SEC
BH CV ECHO MEAS - MV DEC SLOPE: 582 CM/SEC^2
BH CV ECHO MEAS - MV DEC TIME: 0.24 SEC
BH CV ECHO MEAS - MV E MAX VEL: 83.9 CM/SEC
BH CV ECHO MEAS - MV E/A: 0.8
BH CV ECHO MEAS - MV MAX PG: 4.8 MMHG
BH CV ECHO MEAS - MV MEAN PG: 2 MMHG
BH CV ECHO MEAS - MV P1/2T MAX VEL: 105 CM/SEC
BH CV ECHO MEAS - MV P1/2T: 52.8 MSEC
BH CV ECHO MEAS - MV V2 MAX: 110 CM/SEC
BH CV ECHO MEAS - MV V2 MEAN: 65.7 CM/SEC
BH CV ECHO MEAS - MV V2 VTI: 29.3 CM
BH CV ECHO MEAS - MVA P1/2T LCG: 2.1 CM^2
BH CV ECHO MEAS - MVA(P1/2T): 4.2 CM^2
BH CV ECHO MEAS - MVA(VTI): 3.6 CM^2
BH CV ECHO MEAS - PA ACC TIME: 0.08 SEC
BH CV ECHO MEAS - PA MAX PG (FULL): 2.1 MMHG
BH CV ECHO MEAS - PA MAX PG: 3.2 MMHG
BH CV ECHO MEAS - PA PR(ACCEL): 42.6 MMHG
BH CV ECHO MEAS - PA V2 MAX: 89.6 CM/SEC
BH CV ECHO MEAS - PI END-D VEL: 63.8 CM/SEC
BH CV ECHO MEAS - PULM A REVS DUR: 0.16 SEC
BH CV ECHO MEAS - PULM A REVS VEL: 24.3 CM/SEC
BH CV ECHO MEAS - PULM DIAS VEL: 49.1 CM/SEC
BH CV ECHO MEAS - PULM S/D: 1.3
BH CV ECHO MEAS - PULM SYS VEL: 62.6 CM/SEC
BH CV ECHO MEAS - PVA(V,A): 2.2 CM^2
BH CV ECHO MEAS - PVA(V,D): 2.2 CM^2
BH CV ECHO MEAS - QP/QS: 0.48
BH CV ECHO MEAS - RAP SYSTOLE: 3 MMHG
BH CV ECHO MEAS - RV MAX PG: 1.1 MMHG
BH CV ECHO MEAS - RV MEAN PG: 1 MMHG
BH CV ECHO MEAS - RV V1 MAX: 52.5 CM/SEC
BH CV ECHO MEAS - RV V1 MEAN: 37.9 CM/SEC
BH CV ECHO MEAS - RV V1 VTI: 13.3 CM
BH CV ECHO MEAS - RVOT AREA: 3.8 CM^2
BH CV ECHO MEAS - RVOT DIAM: 2.2 CM
BH CV ECHO MEAS - RVSP: 25.1 MMHG
BH CV ECHO MEAS - SI(AO): 110.6 ML/M^2
BH CV ECHO MEAS - SI(CUBED): 26 ML/M^2
BH CV ECHO MEAS - SI(LVOT): 41.4 ML/M^2
BH CV ECHO MEAS - SI(MOD-SP2): 24.6 ML/M^2
BH CV ECHO MEAS - SI(MOD-SP4): 33.3 ML/M^2
BH CV ECHO MEAS - SI(TEICH): 24.1 ML/M^2
BH CV ECHO MEAS - SV(AO): 279 ML
BH CV ECHO MEAS - SV(CUBED): 65.5 ML
BH CV ECHO MEAS - SV(LVOT): 104.5 ML
BH CV ECHO MEAS - SV(MOD-SP2): 62 ML
BH CV ECHO MEAS - SV(MOD-SP4): 84 ML
BH CV ECHO MEAS - SV(RVOT): 50.6 ML
BH CV ECHO MEAS - SV(TEICH): 60.7 ML
BH CV ECHO MEAS - TAPSE (>1.6): 2.5 CM2
BH CV ECHO MEAS - TR MAX VEL: 235 CM/SEC
BH CV ECHO MEASUREMENTS AVERAGE E/E' RATIO: 8.43
BH CV VAS BP RIGHT ARM: NORMAL MMHG
BH CV XLRA - RV BASE: 3.5 CM
BH CV XLRA - RV LENGTH: 6.6 CM
BH CV XLRA - RV MID: 3.3 CM
BH CV XLRA - TDI S': 11.3 CM/SEC
LEFT ATRIUM VOLUME INDEX: 33 ML/M2
LV EF 2D ECHO EST: 75 %
MAXIMAL PREDICTED HEART RATE: 153 BPM
STRESS TARGET HR: 130 BPM

## 2018-07-09 PROCEDURE — 93306 TTE W/DOPPLER COMPLETE: CPT

## 2018-07-09 PROCEDURE — 93306 TTE W/DOPPLER COMPLETE: CPT | Performed by: INTERNAL MEDICINE

## 2018-07-13 DIAGNOSIS — M25.561 PAIN IN BOTH KNEES, UNSPECIFIED CHRONICITY: ICD-10-CM

## 2018-07-13 DIAGNOSIS — M25.562 PAIN IN BOTH KNEES, UNSPECIFIED CHRONICITY: ICD-10-CM

## 2018-07-13 RX ORDER — LIDOCAINE 50 MG/G
PATCH TOPICAL
Qty: 10 PATCH | Refills: 0 | Status: SHIPPED | OUTPATIENT
Start: 2018-07-13 | End: 2018-07-25 | Stop reason: SDUPTHER

## 2018-07-23 ENCOUNTER — OFFICE VISIT (OUTPATIENT)
Dept: INTERNAL MEDICINE | Facility: CLINIC | Age: 68
End: 2018-07-23

## 2018-07-23 VITALS
DIASTOLIC BLOOD PRESSURE: 78 MMHG | HEIGHT: 69 IN | WEIGHT: 293 LBS | BODY MASS INDEX: 43.4 KG/M2 | SYSTOLIC BLOOD PRESSURE: 130 MMHG

## 2018-07-23 DIAGNOSIS — I10 ESSENTIAL HYPERTENSION: ICD-10-CM

## 2018-07-23 DIAGNOSIS — I87.2 VENOUS INSUFFICIENCY: Primary | ICD-10-CM

## 2018-07-23 PROCEDURE — 99213 OFFICE O/P EST LOW 20 MIN: CPT | Performed by: INTERNAL MEDICINE

## 2018-07-23 NOTE — PROGRESS NOTES
Subjective   Terra Peoples is a 68 y.o. female.     History of Present Illness she is here today for follow-up of hypertension and leg edema.  She really seldom wears her compression stockings although they do help to some extent.  She denies any HOYOS, PND, or chest pain.        Review of Systems   Constitutional: Negative for activity change, appetite change, fatigue and unexpected weight gain.   Respiratory: Negative for cough, chest tightness, shortness of breath and wheezing.    Cardiovascular: Positive for leg swelling. Negative for chest pain and palpitations.   Gastrointestinal: Negative for abdominal pain.   Genitourinary: Negative for flank pain.   Neurological: Negative for dizziness, syncope and weakness.       Objective   Physical Exam   Constitutional: She is oriented to person, place, and time. Vital signs are normal. She appears well-developed and well-nourished. She is active. She does not appear ill.   Cardiovascular: Normal rate, regular rhythm, S1 normal and S2 normal.  Exam reveals no S3 and no S4.    No murmur heard.  Pulmonary/Chest: No tachypnea. No respiratory distress. She has no decreased breath sounds. She has no wheezes. She has no rhonchi. She has no rales.   Abdominal: Soft. Normal appearance. She exhibits no abdominal bruit and no mass. There is no hepatosplenomegaly. There is no tenderness.     Vascular Status -  Her right foot exhibits abnormal foot edema (1-2+ malleolar edema). Her left foot exhibits abnormal foot edema (1-2+ malleolar edema).  Neurological: She is alert and oriented to person, place, and time. Gait normal.   Psychiatric: She has a normal mood and affect. Her speech is normal and behavior is normal. Judgment and thought content normal. Cognition and memory are normal.         Assessment/Plan echocardiogram shows ejection fraction 70%.  Earlier this year CMP and urinalysis as well as TSH were normal.    Assessment #1 hypertension-good control #2 edema-secondary  to chronic venous insufficiency and unfortunately she is not compliant with her compression stockings.    Plan #1 begin Lasix 40 mg by mouth daily and KCl 20 mEq by mouth daily.  Routine follow-up with me in one month.

## 2018-07-25 DIAGNOSIS — M25.562 PAIN IN BOTH KNEES, UNSPECIFIED CHRONICITY: ICD-10-CM

## 2018-07-25 DIAGNOSIS — M25.561 PAIN IN BOTH KNEES, UNSPECIFIED CHRONICITY: ICD-10-CM

## 2018-07-25 RX ORDER — LIDOCAINE 50 MG/G
PATCH TOPICAL
Qty: 10 PATCH | Refills: 0 | Status: SHIPPED | OUTPATIENT
Start: 2018-07-25 | End: 2018-08-01 | Stop reason: SDUPTHER

## 2018-08-01 DIAGNOSIS — M25.561 PAIN IN BOTH KNEES, UNSPECIFIED CHRONICITY: ICD-10-CM

## 2018-08-01 DIAGNOSIS — M25.562 PAIN IN BOTH KNEES, UNSPECIFIED CHRONICITY: ICD-10-CM

## 2018-08-01 RX ORDER — LIDOCAINE 50 MG/G
PATCH TOPICAL
Qty: 10 PATCH | Refills: 0 | Status: SHIPPED | OUTPATIENT
Start: 2018-08-01 | End: 2019-05-01

## 2018-08-20 ENCOUNTER — OFFICE VISIT (OUTPATIENT)
Dept: INTERNAL MEDICINE | Facility: CLINIC | Age: 68
End: 2018-08-20

## 2018-08-20 VITALS
HEIGHT: 69 IN | SYSTOLIC BLOOD PRESSURE: 118 MMHG | BODY MASS INDEX: 43.4 KG/M2 | DIASTOLIC BLOOD PRESSURE: 62 MMHG | WEIGHT: 293 LBS

## 2018-08-20 DIAGNOSIS — I10 ESSENTIAL HYPERTENSION: ICD-10-CM

## 2018-08-20 DIAGNOSIS — I87.2 VENOUS INSUFFICIENCY: Primary | ICD-10-CM

## 2018-08-20 PROCEDURE — 99213 OFFICE O/P EST LOW 20 MIN: CPT | Performed by: INTERNAL MEDICINE

## 2018-08-20 RX ORDER — POTASSIUM CHLORIDE 20 MEQ/1
20 TABLET, EXTENDED RELEASE ORAL DAILY
COMMUNITY
Start: 2018-07-24 | End: 2019-01-31

## 2018-08-20 RX ORDER — FUROSEMIDE 40 MG/1
40 TABLET ORAL DAILY
COMMUNITY
Start: 2018-07-24 | End: 2019-01-31

## 2018-08-20 NOTE — PROGRESS NOTES
Subjective   Terra Peoples is a 68 y.o. female.     History of Present Illness she is here today for follow-up of hypertension and chronic venous insufficiency.  She was started on Lasix 40 mg daily with potassium once daily at last visit.  She has had significant decrease in her leg edema.  She denies any HOYOS, PND, or chest pain.  There has been no dizziness, sick be, or focal neurologic episodes.        Review of Systems   Constitutional: Negative for activity change, appetite change and fatigue.   Respiratory: Negative for cough, chest tightness, shortness of breath and wheezing.    Cardiovascular: Negative for chest pain, palpitations and leg swelling.   Gastrointestinal: Negative for abdominal pain.   Genitourinary: Negative for flank pain.   Neurological: Negative for dizziness, syncope, facial asymmetry, speech difficulty, weakness, numbness and headache.       Objective   Physical Exam   Constitutional: She is oriented to person, place, and time. Vital signs are normal. She appears well-developed and well-nourished. She is active. She does not appear ill.   Eyes: Conjunctivae are normal.   Cardiovascular: Normal rate, regular rhythm, S1 normal and S2 normal.  Exam reveals no S3 and no S4.    No murmur heard.  Pulses:       Dorsalis pedis pulses are 2+ on the right side, and 2+ on the left side.   Pulmonary/Chest: No tachypnea. No respiratory distress. She has no decreased breath sounds. She has no wheezes. She has no rhonchi. She has no rales.     Vascular Status -  Her right foot exhibits no edema. Her left foot exhibits abnormal foot edema (Trace malleolar edema).  Neurological: She is alert and oriented to person, place, and time. Gait normal.   Psychiatric: She has a normal mood and affect. Her speech is normal and behavior is normal. Judgment and thought content normal. Cognition and memory are normal.         Assessment/Plan assessment #1 chronic venous insufficiency-much improved with Lasix No. 2  hypertension-good control    Plan #1 no change medication #2 BMP today.  Routine follow-up in 6 months.

## 2018-08-21 LAB
BUN SERPL-MCNC: 16 MG/DL (ref 8–27)
BUN/CREAT SERPL: 16 (ref 12–28)
CALCIUM SERPL-MCNC: 10.3 MG/DL (ref 8.7–10.3)
CHLORIDE SERPL-SCNC: 103 MMOL/L (ref 96–106)
CO2 SERPL-SCNC: 28 MMOL/L (ref 20–29)
CREAT SERPL-MCNC: 1 MG/DL (ref 0.57–1)
GLUCOSE SERPL-MCNC: 152 MG/DL (ref 65–99)
POTASSIUM SERPL-SCNC: 5.1 MMOL/L (ref 3.5–5.2)
SODIUM SERPL-SCNC: 144 MMOL/L (ref 134–144)

## 2018-08-21 RX ORDER — LISINOPRIL 40 MG/1
TABLET ORAL
Qty: 90 TABLET | Refills: 3 | Status: SHIPPED | OUTPATIENT
Start: 2018-08-21 | End: 2019-01-31 | Stop reason: SDUPTHER

## 2018-08-21 RX ORDER — OXYBUTYNIN CHLORIDE 5 MG/1
TABLET ORAL
Qty: 90 TABLET | Refills: 3 | Status: SHIPPED | OUTPATIENT
Start: 2018-08-21 | End: 2019-01-31

## 2018-09-06 DIAGNOSIS — E11.9 DIABETES MELLITUS TYPE 2, NONINSULIN DEPENDENT (HCC): ICD-10-CM

## 2018-09-10 DIAGNOSIS — E03.9 ADULT HYPOTHYROIDISM: ICD-10-CM

## 2018-09-10 RX ORDER — LEVOTHYROXINE SODIUM 0.05 MG/1
TABLET ORAL
Qty: 30 TABLET | Refills: 0 | OUTPATIENT
Start: 2018-09-10

## 2018-09-10 RX ORDER — LEVOTHYROXINE SODIUM 0.05 MG/1
50 TABLET ORAL DAILY
Qty: 30 TABLET | Refills: 0 | Status: SHIPPED | OUTPATIENT
Start: 2018-09-10 | End: 2018-10-09 | Stop reason: SDUPTHER

## 2018-09-10 NOTE — TELEPHONE ENCOUNTER
# 30 tablets of Levothyroxine has been sent in to [facility] in [address] [place] [address].    Patient has a NP appointment with new provider on 10/2/18.

## 2018-10-09 DIAGNOSIS — E03.9 ADULT HYPOTHYROIDISM: ICD-10-CM

## 2018-10-09 RX ORDER — LEVOTHYROXINE SODIUM 0.05 MG/1
TABLET ORAL
Qty: 30 TABLET | Refills: 3 | Status: SHIPPED | OUTPATIENT
Start: 2018-10-09 | End: 2019-01-31 | Stop reason: SDUPTHER

## 2019-01-31 ENCOUNTER — OFFICE VISIT (OUTPATIENT)
Dept: FAMILY MEDICINE CLINIC | Facility: CLINIC | Age: 69
End: 2019-01-31

## 2019-01-31 VITALS
WEIGHT: 293 LBS | OXYGEN SATURATION: 99 % | HEIGHT: 69 IN | BODY MASS INDEX: 43.4 KG/M2 | RESPIRATION RATE: 14 BRPM | DIASTOLIC BLOOD PRESSURE: 86 MMHG | HEART RATE: 75 BPM | SYSTOLIC BLOOD PRESSURE: 138 MMHG

## 2019-01-31 DIAGNOSIS — M54.50 LUMBAR PAIN: ICD-10-CM

## 2019-01-31 DIAGNOSIS — M25.561 CHRONIC PAIN OF BOTH KNEES: ICD-10-CM

## 2019-01-31 DIAGNOSIS — E11.9 DIABETES MELLITUS TYPE 2, NONINSULIN DEPENDENT (HCC): Primary | ICD-10-CM

## 2019-01-31 DIAGNOSIS — E03.9 HYPOTHYROIDISM, ADULT: Primary | ICD-10-CM

## 2019-01-31 DIAGNOSIS — E04.9 GOITER: ICD-10-CM

## 2019-01-31 DIAGNOSIS — I10 ESSENTIAL HYPERTENSION: ICD-10-CM

## 2019-01-31 DIAGNOSIS — M25.562 CHRONIC PAIN OF BOTH KNEES: ICD-10-CM

## 2019-01-31 DIAGNOSIS — E03.9 ADULT HYPOTHYROIDISM: ICD-10-CM

## 2019-01-31 DIAGNOSIS — G89.29 CHRONIC PAIN OF BOTH KNEES: ICD-10-CM

## 2019-01-31 DIAGNOSIS — R39.15 URINARY URGENCY: ICD-10-CM

## 2019-01-31 LAB
BILIRUB BLD-MCNC: NEGATIVE MG/DL
CLARITY, POC: CLEAR
COLOR UR: YELLOW
GLUCOSE UR STRIP-MCNC: NEGATIVE MG/DL
KETONES UR QL: ABNORMAL
LEUKOCYTE EST, POC: NEGATIVE
NITRITE UR-MCNC: NEGATIVE MG/ML
PH UR: 5 [PH] (ref 5–8)
PROT UR STRIP-MCNC: ABNORMAL MG/DL
RBC # UR STRIP: NEGATIVE /UL
SP GR UR: 1.03 (ref 1–1.03)
UROBILINOGEN UR QL: NORMAL

## 2019-01-31 PROCEDURE — 81002 URINALYSIS NONAUTO W/O SCOPE: CPT | Performed by: FAMILY MEDICINE

## 2019-01-31 PROCEDURE — 99204 OFFICE O/P NEW MOD 45 MIN: CPT | Performed by: FAMILY MEDICINE

## 2019-01-31 RX ORDER — PIOGLITAZONEHYDROCHLORIDE 45 MG/1
45 TABLET ORAL DAILY
Qty: 90 TABLET | Refills: 3 | Status: SHIPPED | OUTPATIENT
Start: 2019-01-31 | End: 2020-03-10 | Stop reason: SDUPTHER

## 2019-01-31 RX ORDER — LISINOPRIL 40 MG/1
40 TABLET ORAL DAILY
Qty: 90 TABLET | Refills: 3 | Status: SHIPPED | OUTPATIENT
Start: 2019-01-31 | End: 2020-03-10 | Stop reason: SDUPTHER

## 2019-01-31 RX ORDER — AMOXICILLIN 500 MG/1
CAPSULE ORAL
Refills: 0 | COMMUNITY
Start: 2019-01-28 | End: 2019-03-11

## 2019-01-31 RX ORDER — LEVOTHYROXINE SODIUM 0.05 MG/1
50 TABLET ORAL DAILY
Qty: 30 TABLET | Refills: 3 | Status: SHIPPED | OUTPATIENT
Start: 2019-01-31 | End: 2019-02-02

## 2019-01-31 RX ORDER — HYDROCODONE BITARTRATE AND ACETAMINOPHEN 5; 325 MG/1; MG/1
1 TABLET ORAL EVERY 6 HOURS PRN
COMMUNITY
End: 2019-03-11

## 2019-01-31 RX ORDER — GLIPIZIDE 10 MG/1
10 TABLET, FILM COATED, EXTENDED RELEASE ORAL DAILY
Qty: 90 TABLET | Refills: 3 | Status: SHIPPED | OUTPATIENT
Start: 2019-01-31 | End: 2020-03-10 | Stop reason: SDUPTHER

## 2019-02-01 LAB
ALBUMIN SERPL-MCNC: 4.2 G/DL (ref 3.6–4.8)
ALBUMIN/CREAT UR: 9.7 MG/G CREAT (ref 0–30)
ALBUMIN/GLOB SERPL: 1.1 {RATIO} (ref 1.2–2.2)
ALP SERPL-CCNC: 89 IU/L (ref 39–117)
ALT SERPL-CCNC: 11 IU/L (ref 0–32)
AST SERPL-CCNC: 17 IU/L (ref 0–40)
BILIRUB SERPL-MCNC: <0.2 MG/DL (ref 0–1.2)
BUN SERPL-MCNC: 11 MG/DL (ref 8–27)
BUN/CREAT SERPL: 10 (ref 12–28)
CALCIUM SERPL-MCNC: 10.6 MG/DL (ref 8.7–10.3)
CHLORIDE SERPL-SCNC: 104 MMOL/L (ref 96–106)
CO2 SERPL-SCNC: 25 MMOL/L (ref 20–29)
CREAT SERPL-MCNC: 1.05 MG/DL (ref 0.57–1)
CREAT UR-MCNC: 217.6 MG/DL
ERYTHROCYTE [DISTWIDTH] IN BLOOD BY AUTOMATED COUNT: 14.1 % (ref 12.3–15.4)
GLOBULIN SER CALC-MCNC: 3.7 G/DL (ref 1.5–4.5)
GLUCOSE SERPL-MCNC: 98 MG/DL (ref 65–99)
HBA1C MFR BLD: 6.5 % (ref 4.8–5.6)
HCT VFR BLD AUTO: 35.5 % (ref 34–46.6)
HGB BLD-MCNC: 11.3 G/DL (ref 11.1–15.9)
MCH RBC QN AUTO: 27.8 PG (ref 26.6–33)
MCHC RBC AUTO-ENTMCNC: 31.8 G/DL (ref 31.5–35.7)
MCV RBC AUTO: 87 FL (ref 79–97)
MICROALBUMIN UR-MCNC: 21 UG/ML
PLATELET # BLD AUTO: 435 X10E3/UL (ref 150–379)
POTASSIUM SERPL-SCNC: 4.3 MMOL/L (ref 3.5–5.2)
PROT SERPL-MCNC: 7.9 G/DL (ref 6–8.5)
RBC # BLD AUTO: 4.07 X10E6/UL (ref 3.77–5.28)
SODIUM SERPL-SCNC: 146 MMOL/L (ref 134–144)
TSH SERPL DL<=0.005 MIU/L-ACNC: 0.69 UIU/ML (ref 0.45–4.5)
WBC # BLD AUTO: 7.9 X10E3/UL (ref 3.4–10.8)

## 2019-02-04 ENCOUNTER — OFFICE VISIT (OUTPATIENT)
Dept: SPORTS MEDICINE | Facility: CLINIC | Age: 69
End: 2019-02-04

## 2019-02-04 VITALS
HEIGHT: 69 IN | SYSTOLIC BLOOD PRESSURE: 158 MMHG | BODY MASS INDEX: 43.4 KG/M2 | WEIGHT: 293 LBS | DIASTOLIC BLOOD PRESSURE: 78 MMHG

## 2019-02-04 DIAGNOSIS — M25.561 PAIN IN BOTH KNEES, UNSPECIFIED CHRONICITY: Primary | ICD-10-CM

## 2019-02-04 DIAGNOSIS — M17.0 PRIMARY OSTEOARTHRITIS OF BOTH KNEES: ICD-10-CM

## 2019-02-04 DIAGNOSIS — M25.562 PAIN IN BOTH KNEES, UNSPECIFIED CHRONICITY: Primary | ICD-10-CM

## 2019-02-04 PROCEDURE — 73562 X-RAY EXAM OF KNEE 3: CPT | Performed by: FAMILY MEDICINE

## 2019-02-04 PROCEDURE — 99214 OFFICE O/P EST MOD 30 MIN: CPT | Performed by: FAMILY MEDICINE

## 2019-02-04 PROCEDURE — 20611 DRAIN/INJ JOINT/BURSA W/US: CPT | Performed by: FAMILY MEDICINE

## 2019-02-04 RX ORDER — TRIAMCINOLONE ACETONIDE 40 MG/ML
160 INJECTION, SUSPENSION INTRA-ARTICULAR; INTRAMUSCULAR ONCE
Status: COMPLETED | OUTPATIENT
Start: 2019-02-04 | End: 2019-02-04

## 2019-02-04 RX ADMIN — TRIAMCINOLONE ACETONIDE 160 MG: 40 INJECTION, SUSPENSION INTRA-ARTICULAR; INTRAMUSCULAR at 11:00

## 2019-02-04 NOTE — PROGRESS NOTES
"Terra is a 68 y.o. year old female    Chief Complaint   Patient presents with   • Knee Pain     Bilateral knee pain, x 3 yrs now, no previous x-rays        History of Present Illness  HPI   Here today for bilateral knee pain referred by Dr. Kay. Pain is chronic, present for a few years.  Aching in nature.  She did fall a few years ago which seemed to aggravate this, but it has been present since. Relatively mild today. Not improved by tyelnol. Using a cane sometimes for stability.     I have reviewed the patient's medical, family, and social history in detail and updated the computerized patient record.    Review of Systems   Constitutional: Negative for fever.   Musculoskeletal:        Per HPI   Skin: Negative for wound.   Neurological: Negative for numbness.   All other systems reviewed and are negative.      /78 (BP Location: Left arm, Patient Position: Sitting, Cuff Size: Large Adult)   Ht 175.3 cm (69.02\")   Wt (!) 146 kg (322 lb)   LMP  (LMP Unknown)   BMI 47.53 kg/m²      Physical Exam    Vital signs reviewed.   General: No acute distress. Obese  Eyes: conjunctiva clear; pupils equally round and reactive  ENT: external ears and nose atraumatic; oropharynx clear  CV: no peripheral edema, 2+ distal pulses  Resp: normal respiratory effort, no use of accessory muscles  Skin: no rashes or wounds; normal turgor  Psych: mood and affect appropriate; recent and remote memory intact  Neuro: sensation to light touch intact    MSK Exam:  Right Knee Exam     Tenderness   The patient is experiencing no tenderness.     Range of Motion   The patient has normal right knee ROM.    Tests   Apollo:  Medial - negative Lateral - negative  Varus: negative Valgus: negative      Left Knee Exam     Tenderness   The patient is experiencing no tenderness.     Range of Motion   The patient has normal left knee ROM.    Tests   Apollo:  Medial - negative Lateral - negative  Varus: negative Valgus: " "negative        Bilateral Knee X-Ray  Indication: Pain    Views: AP, Lateral, and Slinger    Findings:  No fracture  No bony lesion  Normal soft tissues  Moderate degenerative changes bilaterally    No prior studies were available for comparison.     Bilateral Knee Injection Procedure Note    Bilateral knee injection was discussed with the patient in detail, including indication, risks, benefits, and alternatives. Verbal consent was given for the procedure. Injection was performed by MD.  Injection site was identified by ultrasound examination and cleaned with Betadine and alcohol swabs. Prior to needle insertion, ethyl chloride spray was used for surface anesthesia. Sterile technique was used.  Ultrasound guidance was used for the procedure for confirmation of needle placement and patient comfort given body habitus.  A 25-gauge, 1.5\" needle was directed to the joint from a lateral approach. Injectate was passed into the joint space without difficulty. The needle was removed and a simple bandage was applied. The procedure was tolerated well without difficulty.    Injection mixture:  1% lidocaine without epinephrine: 1 mL  40 mg/mL triamcinolone acetonide: 2 mL     Diagnoses and all orders for this visit:    Pain in both knees, unspecified chronicity  -     XR Knee 3 View Bilateral    Primary osteoarthritis of both knees  -     triamcinolone acetonide (KENALOG-40) injection 160 mg; Inject 4 mL into the appropriate joint as directed by provider 1 (One) Time.  -     Ambulatory Referral to Physical Therapy Evaluate and treat    We had a long discussion of nonsurgical treatment options for osteoarthritis of the knees, including oral medications, topical medications, bracing, PT, steroid injections, viscosupplementation, and other regenerative treatments including PRP and stem cell therapy.  Agreed on PT and steroid injections today. Recheck in 1 month.     EMR Dragon/Transcription disclaimer:    Much of this encounter " note is an electronic transcription/translation of spoken language to printed text.  The electronic translation of spoken language may permit erroneous, or at times, nonsensical words or phrases to be inadvertently transcribed.  Although I have reviewed the note for such errors some may still exist.

## 2019-02-07 ENCOUNTER — TELEPHONE (OUTPATIENT)
Dept: FAMILY MEDICINE CLINIC | Facility: CLINIC | Age: 69
End: 2019-02-07

## 2019-02-07 DIAGNOSIS — E83.52 SERUM CALCIUM ELEVATED: ICD-10-CM

## 2019-02-07 DIAGNOSIS — R53.83 FATIGUE, UNSPECIFIED TYPE: Primary | ICD-10-CM

## 2019-02-07 DIAGNOSIS — Z13.1 SCREENING FOR DIABETES MELLITUS: ICD-10-CM

## 2019-02-07 NOTE — TELEPHONE ENCOUNTER
Called and spoke with patient to let her know lab results and asked her to schedule repeat labs.    ----- Message from Rhina Kay MD sent at 2/2/2019  7:30 PM EST -----  Please advise pt that kidney function is slightly decreased compared to last set of labs.  Advise pt to drink 2 liters of water per day and avoid all NSAID medications (Ibuprofen, Advil, Aleve, and Aspirin). It would be okay to take Tylenol only if needed for aches and pains.    Calcium level is also running high.  Ask pt to schedule a lab appointment for follow up non-fasting labs (PTH, PTHrP, ionized calcium, repeat CMPand vitamin D levels) within the next 2 weeks.  If pt is taking a calcium supplement, I would advise discontinuing it for now.      Platelets, a type of blood cell involved in clotting were high.  Let's repeat a CBC as well.      Diabetes is well controlled.    TSH is normal and she hadn't been taking levothyroxine when these labs were drawn, so she should discontinue levothyroxine.      Let's set up a follow up office visit 2-3 days after repeat labs are drawn.

## 2019-02-18 ENCOUNTER — HOSPITAL ENCOUNTER (OUTPATIENT)
Dept: ULTRASOUND IMAGING | Facility: HOSPITAL | Age: 69
Discharge: HOME | End: 2019-02-18
Admitting: FAMILY MEDICINE

## 2019-02-18 PROCEDURE — 76536 US EXAM OF HEAD AND NECK: CPT

## 2019-02-21 ENCOUNTER — RESULTS ENCOUNTER (OUTPATIENT)
Dept: FAMILY MEDICINE CLINIC | Facility: CLINIC | Age: 69
End: 2019-02-21

## 2019-02-21 DIAGNOSIS — Z13.1 SCREENING FOR DIABETES MELLITUS: ICD-10-CM

## 2019-02-21 DIAGNOSIS — R53.83 FATIGUE, UNSPECIFIED TYPE: ICD-10-CM

## 2019-02-21 DIAGNOSIS — E83.52 SERUM CALCIUM ELEVATED: ICD-10-CM

## 2019-02-25 ENCOUNTER — TREATMENT (OUTPATIENT)
Dept: PHYSICAL THERAPY | Facility: CLINIC | Age: 69
End: 2019-02-25

## 2019-02-25 DIAGNOSIS — G89.29 CHRONIC PAIN OF BOTH KNEES: Primary | ICD-10-CM

## 2019-02-25 DIAGNOSIS — M25.562 CHRONIC PAIN OF BOTH KNEES: Primary | ICD-10-CM

## 2019-02-25 DIAGNOSIS — M25.561 CHRONIC PAIN OF BOTH KNEES: Primary | ICD-10-CM

## 2019-02-25 PROCEDURE — 97110 THERAPEUTIC EXERCISES: CPT | Performed by: PHYSICAL THERAPIST

## 2019-02-25 PROCEDURE — 97530 THERAPEUTIC ACTIVITIES: CPT | Performed by: PHYSICAL THERAPIST

## 2019-02-25 PROCEDURE — 97161 PT EVAL LOW COMPLEX 20 MIN: CPT | Performed by: PHYSICAL THERAPIST

## 2019-02-25 NOTE — PATIENT INSTRUCTIONS
Educated about Dx and exam findings, rationale and POC. Gave handout on HEP with instructions. Basic joint protection principles

## 2019-02-25 NOTE — PROGRESS NOTES
Physical Therapy Initial Evaluation and Plan of Care        Subjective Evaluation    History of Present Illness  Mechanism of injury: 2 falls landing on knees about 3 yrs ago; recent injection both knees which has helped; prior injections almost 3 yrs ago which didn't help      Patient Occupation: retired    Precautions and Work Restrictions: N[employer]  Current pain ratin  At best pain ratin  At worst pain rating: 10  Location: lateral dimitri knees and distal thighs; occasional near giving way but no catching/locking  Quality: dull ache  Relieving factors: medications (ointment)  Aggravating factors: squatting, movement, ambulation and stairs    Social Support  Lives in: multiple-level home  Lives with: adult children    Diagnostic Tests  Abnormal x-ray: arthritis.    Treatments  Previous treatment: injection treatment  Current treatment: medication  Patient Goals  Patient goals for therapy: decreased pain, increased motion and return to sport/leisure activities             Objective       Tenderness     Additional Tenderness Details  Non-tender    Active Range of Motion   Left Knee   Flexion: 118 degrees   Extensor lag: 3 degrees     Right Knee   Flexion: 123 degrees   Extensor lag: 3 degrees     Patellar Static Positioning   Left Knee: lateral tilt  Right Knee: lateral tilt    Strength/Myotome Testing     Left Hip   Planes of Motion   Flexion: 4  Abduction: 4+  Adduction: 4  External rotation: 4-  Internal rotation: 4+    Right Hip   Planes of Motion   Flexion: 4  Abduction: 4+  Adduction: 4  External rotation: 4  Internal rotation: 4+    Left Knee   Flexion: 4+  Extension: 4+    Right Knee   Flexion: 4+  Extension: 4+    Tests     Left Knee   Negative patellar apprehension.     Right Knee   Negative patellar apprehension.     Additional Tests Details  Negative lig instability; Tight gastroc, HS and ITB  mild; mod tight quad    Ambulation     Ambulation: Level Surfaces     Additional Level Surfaces  Ambulation Details  Standard cane in R hand; ambulates without it with mild antalgia; mod difficulty standing from seated         Assessment & Plan     Assessment  Impairments: abnormal gait, abnormal or restricted ROM, impaired physical strength and pain with function  Assessment details: 67 yo F with chronic pain dimitri knees and Dx of arthritis presents with mod quad tightness and hip/knee weakness.  Could benefit from skilled PT to improve strength and flexibility for improved gait and ADLs and to manage pain  Prognosis: fair  Prognosis details: obesity  Functional Limitations: walking, uncomfortable because of pain and stooping  Goals  Plan Goals: STGs x 2 wk  1. Increasing ROM and strength for improved ADLs  2. Pain with ADLs < 6/10  3. Review body mechanics and joint protection principles with ADLs  4. Ambulates level surface 300 ft and 4 inch steps with min antalgia    LTGs x 8 wks  1. ROM WFL and strength > 4/5 for improved ADLs including ease with sit-stand  2. Independent with HEP and joint protection principles for prevention  3. Ambulates 500 ft without AD with min antalgia  4. Demonstrates tolerance for return to basic ADLs including ambulating steps to go to [employer]    Plan  Therapy options: will be seen for skilled physical therapy services  Planned modality interventions: cryotherapy, electrical stimulation/Russian stimulation and thermotherapy (hydrocollator packs)  Planned therapy interventions: manual therapy, therapeutic activities, stretching, strengthening, home exercise program and body mechanics training  Frequency: 1-3 x wk.  Duration in weeks: 8  Treatment plan discussed with: patient        Manual Therapy:    0     mins  59006;  Therapeutic Exercise:    18     mins  45016;     Neuromuscular Maria D:    0    mins  10772;    Therapeutic Activity:     9     mins  98764;       Timed Treatment:   27   mins   Total Treatment:     57   mins    PT SIGNATURE: Amarilis Clark PT   DATE TREATMENT  INITIATED: 2/25/2019    Medicare Initial Certification  Certification Period: 5/26/2019  I certify that the therapy services are furnished while this patient is under my care.  The services outlined above are required by this patient, and will be reviewed every 90 days.     PHYSICIAN: Reji Epstein MD      DATE:     Please sign and return via fax to [phone].. Thank you, [facility].

## 2019-02-28 ENCOUNTER — TREATMENT (OUTPATIENT)
Dept: PHYSICAL THERAPY | Facility: CLINIC | Age: 69
End: 2019-02-28

## 2019-02-28 DIAGNOSIS — M25.561 CHRONIC PAIN OF BOTH KNEES: Primary | ICD-10-CM

## 2019-02-28 DIAGNOSIS — G89.29 CHRONIC PAIN OF BOTH KNEES: Primary | ICD-10-CM

## 2019-02-28 DIAGNOSIS — M25.562 CHRONIC PAIN OF BOTH KNEES: Primary | ICD-10-CM

## 2019-02-28 PROCEDURE — 97110 THERAPEUTIC EXERCISES: CPT | Performed by: PHYSICAL THERAPIST

## 2019-02-28 NOTE — PROGRESS NOTES
Physical Therapy Daily Progress Note    To:  Reji Epstein MD    Re:  Terra Richelle    Visit # 2      Subjective   General soreness from exercises    Objective   See Exercise, Manual, and Modality Logs for complete treatment.     Attempted very partial squat but did not franky    Assessment/Plan      No notable change but only 2nd visit.  Able to progress ex lightly.  Still with very labored transitions and gait.    To MD           Manual Therapy:    0     mins  21052;  Therapeutic Exercise:    28     mins  30835;     Neuromuscular Maria D:    0    mins  77708;    Therapeutic Activity:     5     mins  67391;       Timed Treatment:   33   mins   Total Treatment:     33   mins    Amarilis Clark PT  Physical Therapist

## 2019-03-04 ENCOUNTER — OFFICE VISIT (OUTPATIENT)
Dept: SPORTS MEDICINE | Facility: CLINIC | Age: 69
End: 2019-03-04

## 2019-03-04 VITALS
SYSTOLIC BLOOD PRESSURE: 158 MMHG | WEIGHT: 293 LBS | BODY MASS INDEX: 43.4 KG/M2 | DIASTOLIC BLOOD PRESSURE: 60 MMHG | HEIGHT: 69 IN

## 2019-03-04 DIAGNOSIS — M17.0 PRIMARY OSTEOARTHRITIS OF BOTH KNEES: Primary | ICD-10-CM

## 2019-03-04 PROCEDURE — 99213 OFFICE O/P EST LOW 20 MIN: CPT | Performed by: FAMILY MEDICINE

## 2019-03-04 NOTE — PROGRESS NOTES
"Terra is a 68 y.o. year old female    Chief Complaint   Patient presents with   • Knee Pain     f/u       History of Present Illness  HPI   F/U bilat knee OA. Initially had 100% pain relief from injections, but pain started returning with increased activity during PT. Milder than before, aching.     I have reviewed the patient's medical, family, and social history in detail and updated the computerized patient record.    Review of Systems   Constitutional: Negative.        /60 (BP Location: Left arm, Patient Position: Sitting, Cuff Size: Large Adult)   Ht 175.3 cm (69.02\")   Wt (!) 146 kg (322 lb)   LMP  (LMP Unknown)   BMI 47.53 kg/m²      Physical Exam    Vital signs reviewed.   General: No acute distress.  Eyes: conjunctiva clear; pupils equally round and reactive  ENT: external ears and nose atraumatic; oropharynx clear  CV: no peripheral edema, 2+ distal pulses  Resp: normal respiratory effort, no use of accessory muscles  Skin: no rashes or wounds; normal turgor  Psych: mood and affect appropriate; recent and remote memory intact  Neuro: sensation to light touch intact    MSK Exam:  Ortho Exam  Bilat knees - nontender, no effusions.       Diagnoses and all orders for this visit:    Primary osteoarthritis of both knees  -     diclofenac (VOLTAREN) 1 % gel gel; Apply 4 g topically to the appropriate area as directed 4 (Four) Times a Day As Needed (joint pain).    Recommend we try adding viscosupplementation injections next, also voltaren gel prn for added pain relief without systemic nsaid exposure.   Agree with using caution in PT to not over-stress too fast, discussed gentle strengthening.      EMR Dragon/Transcription disclaimer:    Much of this encounter note is an electronic transcription/translation of spoken language to printed text.  The electronic translation of spoken language may permit erroneous, or at times, nonsensical words or phrases to be inadvertently transcribed.  Although I have " reviewed the note for such errors some may still exist.

## 2019-03-11 ENCOUNTER — OFFICE VISIT (OUTPATIENT)
Dept: FAMILY MEDICINE CLINIC | Facility: CLINIC | Age: 69
End: 2019-03-11

## 2019-03-11 VITALS
SYSTOLIC BLOOD PRESSURE: 144 MMHG | RESPIRATION RATE: 14 BRPM | HEIGHT: 69 IN | BODY MASS INDEX: 43.4 KG/M2 | WEIGHT: 293 LBS | DIASTOLIC BLOOD PRESSURE: 76 MMHG | HEART RATE: 83 BPM | OXYGEN SATURATION: 99 %

## 2019-03-11 DIAGNOSIS — E04.2 MULTINODULAR GOITER: ICD-10-CM

## 2019-03-11 DIAGNOSIS — E11.9 DIABETES MELLITUS TYPE 2, NONINSULIN DEPENDENT (HCC): ICD-10-CM

## 2019-03-11 DIAGNOSIS — E83.52 HYPERCALCEMIA: ICD-10-CM

## 2019-03-11 DIAGNOSIS — I10 ESSENTIAL HYPERTENSION: ICD-10-CM

## 2019-03-11 DIAGNOSIS — R82.90 ABNORMAL URINE ODOR: ICD-10-CM

## 2019-03-11 DIAGNOSIS — R79.89 ELEVATED SERUM CREATININE: Primary | ICD-10-CM

## 2019-03-11 LAB
BILIRUB BLD-MCNC: NEGATIVE MG/DL
CLARITY, POC: ABNORMAL
COLOR UR: ABNORMAL
GLUCOSE UR STRIP-MCNC: ABNORMAL MG/DL
KETONES UR QL: NEGATIVE
LEUKOCYTE EST, POC: NEGATIVE
NITRITE UR-MCNC: NEGATIVE MG/ML
PH UR: 5 [PH] (ref 5–8)
PROT UR STRIP-MCNC: ABNORMAL MG/DL
RBC # UR STRIP: NEGATIVE /UL
SP GR UR: 1.03 (ref 1–1.03)
UROBILINOGEN UR QL: NORMAL

## 2019-03-11 PROCEDURE — 99214 OFFICE O/P EST MOD 30 MIN: CPT | Performed by: FAMILY MEDICINE

## 2019-03-11 PROCEDURE — 81002 URINALYSIS NONAUTO W/O SCOPE: CPT | Performed by: FAMILY MEDICINE

## 2019-03-11 RX ORDER — AMLODIPINE BESYLATE 5 MG/1
5 TABLET ORAL DAILY
Qty: 30 TABLET | Refills: 3 | Status: SHIPPED | OUTPATIENT
Start: 2019-03-11 | End: 2019-04-15 | Stop reason: SDUPTHER

## 2019-03-11 NOTE — PROGRESS NOTES
Subjective   Terra Peoples is a 68 y.o. female.     Chief Complaint   Patient presents with   • Hypertension   • Diabetes   • Abnormal Lab   • thryoid follow up       HPI       HTN:  Currently managed with lisinopril 40 mg/daily. Lasix was discontinued during her last office visit due to urinary incontinence. Her blood pressure is elevated at 144/76. She states that she has not been taking her medication regularly, though she did take lisinopril this morning. She also notes that she has been eating a lot of canned soups that are high in sodium. No chest pain, dizziness, headaches, or acute vision changes.    T2DM:  Currently well managed with glipizide 10 mg/daily, pioglitazone 45 mg/daily and metformin 1000 mg/BID. Her last A1C was 6.5% about 1 month ago. No dizziness or weakness.     Abnormal labs:  Creatinine elevated at 1.05 and Calcium elevated at 10.6 on labs dated 1/31/19.     Thyroid follow up:  Thyroid US dated 2/18/19 revealed a multinodular thyroid with largest nodule measuring 4.1 cm in length.  She has never undergone FNA biopsy. TSH normal on 1/31/19 after 2 months without taking thyroid hormone medication, so levothyroxine discontinued.  NO difficulty swallowing or SOA.      Knee pain:  Patient states that her knee pain has significantly improved since she received steroid injections. She is followed by sports medicine (Dr. Epstein). She will f/u with him again on 3/18/19.      problem:  Patient states that her urine has been more malodorous than usual. No fevers, dysuria, hematuria, or flank pain.            Review of Systems   Constitutional: Negative for activity change, appetite change, fatigue and unexpected weight change.   HENT: Negative for congestion, ear pain, sinus pain and tinnitus.    Eyes: Negative for pain and visual disturbance.   Respiratory: Negative for cough, chest tightness, shortness of breath and wheezing.    Cardiovascular: Negative for chest pain and palpitations.    Gastrointestinal: Negative for abdominal pain, constipation, diarrhea, nausea and vomiting.   Genitourinary: Negative for dysuria, flank pain and hematuria.        Malodorous urine  Improved bladder control since discontinuation of diuretic   Musculoskeletal: Negative for arthralgias, back pain and myalgias.   Skin: Negative for rash.   Allergic/Immunologic: Negative for environmental allergies and food allergies.   Neurological: Negative for dizziness, weakness and headaches.   Psychiatric/Behavioral: Negative for dysphoric mood and sleep disturbance. The patient is not nervous/anxious.        Past Medical History:   Diagnosis Date   • Anemia     iron deficiency   • Arthritis    • Chronic knee pain    • Diabetes mellitus (CMS/HCC)    • Edema    • Hyperlipidemia     developed statin related myalgias   • Hypertension    • Hypothyroidism    • Low back pain    • Multinodular goiter    • Overactive bladder    • Stress incontinence of urine    • Thyroid nodule    • Venous stasis dermatitis        Past Surgical History:   Procedure Laterality Date   • HERNIA REPAIR      inguinal   • HYSTERECTOMY     • OOPHORECTOMY         Social History     Tobacco Use   • Smoking status: Never Smoker   • Smokeless tobacco: Never Used   Substance Use Topics   • Alcohol use: No   • Drug use: No     Social History     Social History Narrative    She lives at home with one adult son.  Retired from .         No Known Allergies     Outpatient Medications Prior to Visit   Medication Sig Dispense Refill   • diclofenac (VOLTAREN) 1 % gel gel Apply 4 g topically to the appropriate area as directed 4 (Four) Times a Day As Needed (joint pain). 500 g 3   • glipiZIDE (GLUCOTROL XL) 10 MG 24 hr tablet Take 1 tablet by mouth Daily. 90 tablet 3   • glucose blood test strip OneTouch Ultra Blue In Vitro Strip; Patient Sig: OneTouch Ultra Blue In Vitro Strip TO TEST FOUR TIMES DAILY AND AS NEEDED; 2; 6; 28-Oct-2014; Active     • lidocaine  (LIDODERM) 5 % APPLY 1 PATCH ON THE SKIN DAILY. REMOVE AND DISCARD PATCH WITHIN 12 HOURS OR AS DIRECTED BY DOCTOR 10 patch 0   • Liniments (BLUE-EMU SUPER STRENGTH EX) Apply  topically.     • lisinopril (PRINIVIL,ZESTRIL) 40 MG tablet Take 1 tablet by mouth Daily. 90 tablet 3   • metFORMIN (GLUCOPHAGE) 1000 MG tablet Take 1 tablet by mouth 2 (Two) Times a Day. 180 tablet 3   • pioglitazone (ACTOS) 45 MG tablet Take 1 tablet by mouth Daily. 90 tablet 3   • Pumpkin Seed-Soy Germ (AZO BLADDER CONTROL/GO-LESS PO) Take  by mouth 2 (Two) Times a Day.       Objective     Vitals:    03/11/19 1326   BP: 144/76   Pulse: 83   Resp: 14   SpO2: 99%       Physical Exam   Constitutional: She appears well-developed and well-nourished.   HENT:   Head: Normocephalic and atraumatic.   Eyes: EOM are normal. Pupils are equal, round, and reactive to light.   Neck: Normal range of motion. Neck supple. Thyromegaly present.   Cardiovascular: Normal rate and regular rhythm. Exam reveals no gallop and no friction rub.   No murmur heard.  Pulmonary/Chest: Effort normal and breath sounds normal. No respiratory distress. She has no wheezes. She has no rales.   Musculoskeletal: Normal range of motion.   Lymphadenopathy:     She has no cervical adenopathy.   Neurological: She has normal strength. Gait normal.   Skin: Skin is warm and dry.   Acanthosis nigricans   Psychiatric: She has a normal mood and affect.   Vitals reviewed.       Thyroid US dated 2/18/19:  FINDINGS: Sonographic evaluation of the thyroid was performed. No prior thyroid sonogram is available for comparison. The right lobe of thyroid measures 5.3 x 2.1 x 2.4 cm and the left lobe measures 5.7 x 2.2 x 2.4cm. The isthmus measures 0.4 cm in thickness. There are multiple mixed cystic and solid nodule seen throughout the thyroid. The largest nodule in the right lobe of thyroid measures 1.3 cm in greatest dimension and the largest nodule in left lobe of thyroid measures on the order of  4.1cm in greatest dimension.  IMPRESSION: Multinodular thyroid with the largest nodule measuring on the order 4.1 cm in greatest dimension. Correlation with an ultrasound-guided fine needle aspiration of the dominant nodule in the left lobe is suggested.    ASSESSMENT/PLAN       Problem List Items Addressed This Visit        Cardiovascular and Mediastinum    BP (high blood pressure)    Relevant Medications    Start amLODIPine (NORVASC) 5 MG tablet/day  Continue lisinopril 40 mg/day      Other Visit Diagnoses     Elevated serum creatinine    -  Primary  Repeat CMP      Hypercalcemia      PTH & calcium, vitamin D 25-OH      Multinodular goiter  Refer to ENT    T2DM  well controlled, continue current medication regimen    Abnormal urine odor        Relevant Orders    POC U/A positive for protein and glucose but negative for WBC, nitrites, and bacteria  Urine concentrated with spec grav 1.030  Pt advised to increase PO hydration  Urine Culture - Urine, Urine, Clean Catch            Patient Instructions   Use diclofenac (VOLTAREN) 1 % gel; Apply 4 g topically to the appropriate area as directed 4 (Four) Times a Day As Needed for knee pain.    Set your blood pressure medications next to the TV to help you remember to take them. Take amlodipine and lisinopril together in the morning.     Try to drink plenty of water to reduce the smell of your urine.     Thyroid Nodule  A thyroid nodule is an isolated growth of thyroid cells that forms a lump in your thyroid gland. The thyroid gland is a butterfly-shaped gland. It is found in the lower front of your neck. This gland sends chemical messengers (hormones) through your blood to all parts of your body. These hormones are important in regulating your body temperature and helping your body to use energy. Thyroid nodules are common. Most are not cancerous (are benign). You may have one nodule or several nodules.  Different types of thyroid nodules include:  · Nodules that grow and  fill with fluid (thyroid cysts).  · Nodules that produce too much thyroid hormone (hot nodules or hyperthyroid).  · Nodules that produce no thyroid hormone (cold nodules or hypothyroid).  · Nodules that form from cancer cells (thyroid cancers).    What are the causes?  Usually, the cause of this condition is not known.  What increases the risk?  Factors that make this condition more likely to develop include:  · Increasing age. Thyroid nodules become more common in people who are older than 45 years of age.  · Gender.  ? Benign thyroid nodules are more common in women.  ? Cancerous (malignant) thyroid nodules are more common in men.  · A family history that includes:  ? Thyroid nodules.  ? Pheochromocytoma.  ? Thyroid carcinoma.  ? Hyperparathyroidism.  · Certain kinds of thyroid diseases, such as Hashimoto thyroiditis.  · Lack of iodine.  · A history of head and neck radiation, such as from X-rays.    What are the signs or symptoms?  It is common for this condition to cause no symptoms. If you have symptoms, they may include:  · A lump in your lower neck.  · Feeling a lump or tickle in your throat.  · Pain in your neck, jaw, or ear.  · Having trouble swallowing.    Hot nodules may cause symptoms that include:  · Weight loss.  · Warm, flushed skin.  · Feeling hot.  · Feeling nervous.  · A racing heartbeat.    Cold nodules may cause symptoms that include:  · Weight gain.  · Dry skin.  · Brittle hair. This may also occur with hair loss.  · Feeling cold.  · Fatigue.    Thyroid cancer nodules may cause symptoms that include:  · Hard nodules that feel stuck to the thyroid gland.  · Hoarseness.  · Lumps in the glands near your thyroid (lymph nodes).    How is this diagnosed?  A thyroid nodule may be felt by your health care provider during a physical exam. This condition may also be diagnosed based on your symptoms. You may also have tests, including:  · An ultrasound. This may be done to confirm the diagnosis.  · A  biopsy. This involves taking a sample from the nodule and looking at it under a microscope to see if the nodule is benign.  · Blood tests to make sure that your thyroid is working properly.  · Imaging tests such as MRI or CT scan may be done if:  ? Your nodule is large.  ? Your nodule is blocking your airway.  ? Cancer is suspected.    How is this treated?  Treatment depends on the cause and size of your nodule or nodules. If the nodule is benign, treatment may not be necessary. Your health care provider may monitor the nodule to see if it goes away without treatment. If the nodule continues to grow, is cancerous, or does not go away:  · It may need to be drained with a needle.  · It may need to be removed with surgery.    If you have surgery, part or all of your thyroid gland may need to be removed as well.  Follow these instructions at home:  · Pay attention to any changes in your nodule.  · Take over-the-counter and prescription medicines only as told by your health care provider.  · Keep all follow-up visits as told by your health care provider. This is important.  Contact a health care provider if:  · Your voice changes.  · You have trouble swallowing.  · You have pain in your neck, ear, or jaw that is getting worse.  · Your nodule gets bigger.  · Your nodule starts to make it harder for you to breathe.  Get help right away if:  · You have a sudden fever.  · You feel very weak.  · Your muscles look like they are shrinking (muscle wasting).  · You have mood swings.  · You feel very restless.  · You feel confused.  · You are seeing or hearing things that other people do not see or hear (having hallucinations).  · You feel suddenly nauseous or throw up.  · You suddenly have diarrhea.  · You have chest pain.  · There is a loss of consciousness.  This information is not intended to replace advice given to you by your health care provider. Make sure you discuss any questions you have with your health care  provider.  Document Released: 11/10/2005 Document Revised: 08/20/2017 Document Reviewed: 03/30/2016  [employer] Patient Education © 2019 [employer]      Return in about 1 month (around 4/11/2019) for Recheck BP.       I,Renetta Kirkpatrick, am scribing for, and in the presence of,Rhina Kay MD. 3/11/2019 2:40 PM    I, Rhina Kay MD   personally, performed the services described in this documentation, as scribed by,Renetta Kirkpatrick, in my presence, and it is both accurate and complete.    Rhina Kay MD  03/11/19  3:32 PM

## 2019-03-11 NOTE — PATIENT INSTRUCTIONS
Use diclofenac (VOLTAREN) 1 % gel; Apply 4 g topically to the appropriate area as directed 4 (Four) Times a Day As Needed for knee pain.    Set your blood pressure medications next to the TV to help you remember to take them. Take amlodipine and lisinopril together in the morning.     Try to drink plenty of water to reduce the smell of your urine.     Thyroid Nodule  A thyroid nodule is an isolated growth of thyroid cells that forms a lump in your thyroid gland. The thyroid gland is a butterfly-shaped gland. It is found in the lower front of your neck. This gland sends chemical messengers (hormones) through your blood to all parts of your body. These hormones are important in regulating your body temperature and helping your body to use energy. Thyroid nodules are common. Most are not cancerous (are benign). You may have one nodule or several nodules.  Different types of thyroid nodules include:  · Nodules that grow and fill with fluid (thyroid cysts).  · Nodules that produce too much thyroid hormone (hot nodules or hyperthyroid).  · Nodules that produce no thyroid hormone (cold nodules or hypothyroid).  · Nodules that form from cancer cells (thyroid cancers).    What are the causes?  Usually, the cause of this condition is not known.  What increases the risk?  Factors that make this condition more likely to develop include:  · Increasing age. Thyroid nodules become more common in people who are older than 45 years of age.  · Gender.  ? Benign thyroid nodules are more common in women.  ? Cancerous (malignant) thyroid nodules are more common in men.  · A family history that includes:  ? Thyroid nodules.  ? Pheochromocytoma.  ? Thyroid carcinoma.  ? Hyperparathyroidism.  · Certain kinds of thyroid diseases, such as Hashimoto thyroiditis.  · Lack of iodine.  · A history of head and neck radiation, such as from X-rays.    What are the signs or symptoms?  It is common for this condition to cause no symptoms. If you have  symptoms, they may include:  · A lump in your lower neck.  · Feeling a lump or tickle in your throat.  · Pain in your neck, jaw, or ear.  · Having trouble swallowing.    Hot nodules may cause symptoms that include:  · Weight loss.  · Warm, flushed skin.  · Feeling hot.  · Feeling nervous.  · A racing heartbeat.    Cold nodules may cause symptoms that include:  · Weight gain.  · Dry skin.  · Brittle hair. This may also occur with hair loss.  · Feeling cold.  · Fatigue.    Thyroid cancer nodules may cause symptoms that include:  · Hard nodules that feel stuck to the thyroid gland.  · Hoarseness.  · Lumps in the glands near your thyroid (lymph nodes).    How is this diagnosed?  A thyroid nodule may be felt by your health care provider during a physical exam. This condition may also be diagnosed based on your symptoms. You may also have tests, including:  · An ultrasound. This may be done to confirm the diagnosis.  · A biopsy. This involves taking a sample from the nodule and looking at it under a microscope to see if the nodule is benign.  · Blood tests to make sure that your thyroid is working properly.  · Imaging tests such as MRI or CT scan may be done if:  ? Your nodule is large.  ? Your nodule is blocking your airway.  ? Cancer is suspected.    How is this treated?  Treatment depends on the cause and size of your nodule or nodules. If the nodule is benign, treatment may not be necessary. Your health care provider may monitor the nodule to see if it goes away without treatment. If the nodule continues to grow, is cancerous, or does not go away:  · It may need to be drained with a needle.  · It may need to be removed with surgery.    If you have surgery, part or all of your thyroid gland may need to be removed as well.  Follow these instructions at home:  · Pay attention to any changes in your nodule.  · Take over-the-counter and prescription medicines only as told by your health care provider.  · Keep all follow-up  visits as told by your health care provider. This is important.  Contact a health care provider if:  · Your voice changes.  · You have trouble swallowing.  · You have pain in your neck, ear, or jaw that is getting worse.  · Your nodule gets bigger.  · Your nodule starts to make it harder for you to breathe.  Get help right away if:  · You have a sudden fever.  · You feel very weak.  · Your muscles look like they are shrinking (muscle wasting).  · You have mood swings.  · You feel very restless.  · You feel confused.  · You are seeing or hearing things that other people do not see or hear (having hallucinations).  · You feel suddenly nauseous or throw up.  · You suddenly have diarrhea.  · You have chest pain.  · There is a loss of consciousness.  This information is not intended to replace advice given to you by your health care provider. Make sure you discuss any questions you have with your health care provider.  Document Released: 11/10/2005 Document Revised: 08/20/2017 Document Reviewed: 03/30/2016  [employer] Interactive Patient Education © 2019 [employer]

## 2019-03-12 LAB
25(OH)D3+25(OH)D2 SERPL-MCNC: 9.2 NG/ML (ref 30–100)
ALBUMIN SERPL-MCNC: 4.2 G/DL (ref 3.6–4.8)
ALBUMIN/GLOB SERPL: 1.5 {RATIO} (ref 1.2–2.2)
ALP SERPL-CCNC: 83 IU/L (ref 39–117)
ALT SERPL-CCNC: 12 IU/L (ref 0–32)
AST SERPL-CCNC: 12 IU/L (ref 0–40)
BILIRUB SERPL-MCNC: <0.2 MG/DL (ref 0–1.2)
BUN SERPL-MCNC: 10 MG/DL (ref 8–27)
BUN/CREAT SERPL: 11 (ref 12–28)
CALCIUM SERPL-MCNC: 10.1 MG/DL (ref 8.7–10.3)
CHLORIDE SERPL-SCNC: 104 MMOL/L (ref 96–106)
CO2 SERPL-SCNC: 26 MMOL/L (ref 20–29)
CREAT SERPL-MCNC: 0.93 MG/DL (ref 0.57–1)
ERYTHROCYTE [DISTWIDTH] IN BLOOD BY AUTOMATED COUNT: 14.4 % (ref 12.3–15.4)
GLOBULIN SER CALC-MCNC: 2.8 G/DL (ref 1.5–4.5)
GLUCOSE SERPL-MCNC: 109 MG/DL (ref 65–99)
HCT VFR BLD AUTO: 33.2 % (ref 34–46.6)
HGB BLD-MCNC: 10.6 G/DL (ref 11.1–15.9)
INTACT PTH: NORMAL
MCH RBC QN AUTO: 28 PG (ref 26.6–33)
MCHC RBC AUTO-ENTMCNC: 31.9 G/DL (ref 31.5–35.7)
MCV RBC AUTO: 88 FL (ref 79–97)
PLATELET # BLD AUTO: 416 X10E3/UL (ref 150–379)
POTASSIUM SERPL-SCNC: 4.1 MMOL/L (ref 3.5–5.2)
PROT SERPL-MCNC: 7 G/DL (ref 6–8.5)
PTH-INTACT SERPL-MCNC: 46 PG/ML (ref 15–65)
RBC # BLD AUTO: 3.79 X10E6/UL (ref 3.77–5.28)
SODIUM SERPL-SCNC: 145 MMOL/L (ref 134–144)
WBC # BLD AUTO: 7.5 X10E3/UL (ref 3.4–10.8)

## 2019-03-18 ENCOUNTER — OFFICE VISIT (OUTPATIENT)
Dept: SPORTS MEDICINE | Facility: CLINIC | Age: 69
End: 2019-03-18

## 2019-03-18 VITALS
SYSTOLIC BLOOD PRESSURE: 152 MMHG | HEIGHT: 69 IN | BODY MASS INDEX: 43.4 KG/M2 | DIASTOLIC BLOOD PRESSURE: 68 MMHG | WEIGHT: 293 LBS

## 2019-03-18 DIAGNOSIS — M17.0 PRIMARY OSTEOARTHRITIS OF BOTH KNEES: Primary | ICD-10-CM

## 2019-03-18 PROCEDURE — 20611 DRAIN/INJ JOINT/BURSA W/US: CPT | Performed by: FAMILY MEDICINE

## 2019-03-18 NOTE — PROGRESS NOTES
Here today for Orthovisc injection, # 1/3    Knee Injection Procedure Note     Bilateral knee injection was discussed with the patient in detail, including indication, risks, benefits, and alternatives. Verbal consent was given for the procedure. Injection was performed by physician.  Injection site was identified by physical examination and cleaned with Betadine and alcohol swabs. Prior to needle insertion, ethyl chloride spray was used for surface anesthesia. Sterile technique was used.  Ultrasound guidance was used for the procedure for confirmation of needle placement and patient comfort.    Orthovisc was passed into the joint space without difficulty. The needle was removed and a simple bandage was applied. The procedure was tolerated well without difficulty.    Diagnoses and all orders for this visit:    Primary osteoarthritis of both knees  -     Hyaluronan (ORTHOVISC) injection 30 mg; Inject 2 mL into the appropriate joint as directed by provider 1 (One) Time.  -     Hyaluronan (ORTHOVISC) injection 30 mg; Inject 2 mL into the appropriate joint as directed by provider 1 (One) Time.

## 2019-03-19 ENCOUNTER — TELEPHONE (OUTPATIENT)
Dept: FAMILY MEDICINE CLINIC | Facility: CLINIC | Age: 69
End: 2019-03-19

## 2019-03-19 NOTE — TELEPHONE ENCOUNTER
Patient left a VM stating that she missed a call from our office and calling back to see what is going on.

## 2019-03-25 ENCOUNTER — OFFICE VISIT (OUTPATIENT)
Dept: SPORTS MEDICINE | Facility: CLINIC | Age: 69
End: 2019-03-25

## 2019-03-25 VITALS
SYSTOLIC BLOOD PRESSURE: 130 MMHG | BODY MASS INDEX: 43.4 KG/M2 | WEIGHT: 293 LBS | HEIGHT: 69 IN | DIASTOLIC BLOOD PRESSURE: 82 MMHG

## 2019-03-25 DIAGNOSIS — M17.0 PRIMARY OSTEOARTHRITIS OF BOTH KNEES: Primary | ICD-10-CM

## 2019-03-25 PROCEDURE — 20611 DRAIN/INJ JOINT/BURSA W/US: CPT | Performed by: FAMILY MEDICINE

## 2019-03-25 NOTE — PROGRESS NOTES
Here today for Orthovisc injection, # 2/3    Knee Injection Procedure Note     Bilateral knee injection was discussed with the patient in detail, including indication, risks, benefits, and alternatives. Verbal consent was given for the procedure. Injection was performed by physician.  Injection site was identified and cleaned with Betadine and alcohol swabs. Prior to needle insertion, ethyl chloride spray was used for surface anesthesia. Sterile technique was used.  Ultrasound guidance was used for the procedure for confirmation of needle placement and patient comfort.    Orthovisc was passed into the joint space without difficulty. The needle was removed and a simple bandage was applied. The procedure was tolerated well without difficulty.    Diagnoses and all orders for this visit:    Primary osteoarthritis of both knees  -     Hyaluronan (ORTHOVISC) injection 30 mg  -     Hyaluronan (ORTHOVISC) injection 30 mg

## 2019-04-08 ENCOUNTER — TRANSCRIBE ORDERS (OUTPATIENT)
Dept: ADMINISTRATIVE | Facility: HOSPITAL | Age: 69
End: 2019-04-08

## 2019-04-08 DIAGNOSIS — E04.1 THYROID NODULE: Primary | ICD-10-CM

## 2019-04-15 ENCOUNTER — OFFICE VISIT (OUTPATIENT)
Dept: FAMILY MEDICINE CLINIC | Facility: CLINIC | Age: 69
End: 2019-04-15

## 2019-04-15 VITALS
HEIGHT: 69 IN | DIASTOLIC BLOOD PRESSURE: 82 MMHG | RESPIRATION RATE: 14 BRPM | HEART RATE: 91 BPM | SYSTOLIC BLOOD PRESSURE: 148 MMHG | WEIGHT: 293 LBS | OXYGEN SATURATION: 100 % | BODY MASS INDEX: 43.4 KG/M2

## 2019-04-15 DIAGNOSIS — I10 ESSENTIAL HYPERTENSION: Primary | ICD-10-CM

## 2019-04-15 DIAGNOSIS — E55.9 VITAMIN D DEFICIENCY: ICD-10-CM

## 2019-04-15 DIAGNOSIS — Z11.59 NEED FOR HEPATITIS C SCREENING TEST: ICD-10-CM

## 2019-04-15 DIAGNOSIS — E04.2 MULTINODULAR GOITER: ICD-10-CM

## 2019-04-15 DIAGNOSIS — I10 ESSENTIAL HYPERTENSION: ICD-10-CM

## 2019-04-15 DIAGNOSIS — R82.90 ABNORMAL URINE ODOR: ICD-10-CM

## 2019-04-15 DIAGNOSIS — D64.9 CHRONIC ANEMIA: ICD-10-CM

## 2019-04-15 DIAGNOSIS — E11.42 DIABETIC PERIPHERAL NEUROPATHY (HCC): ICD-10-CM

## 2019-04-15 DIAGNOSIS — E11.9 DIABETES MELLITUS TYPE 2, NONINSULIN DEPENDENT (HCC): ICD-10-CM

## 2019-04-15 DIAGNOSIS — M17.0 PRIMARY OSTEOARTHRITIS OF BOTH KNEES: ICD-10-CM

## 2019-04-15 DIAGNOSIS — E03.9 ADULT HYPOTHYROIDISM: ICD-10-CM

## 2019-04-15 PROCEDURE — 99214 OFFICE O/P EST MOD 30 MIN: CPT | Performed by: FAMILY MEDICINE

## 2019-04-15 RX ORDER — AMLODIPINE BESYLATE 10 MG/1
5 TABLET ORAL DAILY
Qty: 90 TABLET | Refills: 1 | Status: SHIPPED | OUTPATIENT
Start: 2019-04-15 | End: 2020-03-10 | Stop reason: SDUPTHER

## 2019-04-15 RX ORDER — AMLODIPINE BESYLATE 10 MG/1
10 TABLET ORAL DAILY
Qty: 7 TABLET | Refills: 0 | Status: SHIPPED | OUTPATIENT
Start: 2019-04-15 | End: 2019-04-15 | Stop reason: SDUPTHER

## 2019-04-15 RX ORDER — AMLODIPINE BESYLATE 10 MG/1
10 TABLET ORAL DAILY
Qty: 90 TABLET | Refills: 1 | Status: SHIPPED | OUTPATIENT
Start: 2019-04-15 | End: 2019-05-01

## 2019-04-16 LAB
APPEARANCE UR: CLEAR
BACTERIA #/AREA URNS HPF: NORMAL /[HPF]
BASOPHILS # BLD AUTO: 0 X10E3/UL (ref 0–0.2)
BASOPHILS NFR BLD AUTO: 0 %
BILIRUB UR QL STRIP: NEGATIVE
COLOR UR: YELLOW
EOSINOPHIL # BLD AUTO: 0.2 X10E3/UL (ref 0–0.4)
EOSINOPHIL NFR BLD AUTO: 2 %
EPI CELLS #/AREA URNS HPF: NORMAL /HPF
ERYTHROCYTE [DISTWIDTH] IN BLOOD BY AUTOMATED COUNT: 14.1 % (ref 12.3–15.4)
FERRITIN SERPL-MCNC: 84 NG/ML (ref 15–150)
FOLATE SERPL-MCNC: 7.4 NG/ML
GLUCOSE UR QL: NEGATIVE
HBA1C MFR BLD: 6.7 % (ref 4.8–5.6)
HCT VFR BLD AUTO: 35.7 % (ref 34–46.6)
HCV AB S/CO SERPL IA: <0.1 S/CO RATIO (ref 0–0.9)
HGB BLD-MCNC: 11.4 G/DL (ref 11.1–15.9)
HGB UR QL STRIP: NEGATIVE
IMM GRANULOCYTES # BLD AUTO: 0 X10E3/UL (ref 0–0.1)
IMM GRANULOCYTES NFR BLD AUTO: 0 %
IRON SATN MFR SERPL: 12 % (ref 15–55)
IRON SERPL-MCNC: 39 UG/DL (ref 27–139)
KETONES UR QL STRIP: NEGATIVE
LEUKOCYTE ESTERASE UR QL STRIP: NEGATIVE
LYMPHOCYTES # BLD AUTO: 2.1 X10E3/UL (ref 0.7–3.1)
LYMPHOCYTES NFR BLD AUTO: 25 %
MCH RBC QN AUTO: 27.7 PG (ref 26.6–33)
MCHC RBC AUTO-ENTMCNC: 31.9 G/DL (ref 31.5–35.7)
MCV RBC AUTO: 87 FL (ref 79–97)
MICRO URNS: NORMAL
MICRO URNS: NORMAL
MONOCYTES # BLD AUTO: 0.6 X10E3/UL (ref 0.1–0.9)
MONOCYTES NFR BLD AUTO: 7 %
MUCOUS THREADS URNS QL MICRO: PRESENT
NEUTROPHILS # BLD AUTO: 5.4 X10E3/UL (ref 1.4–7)
NEUTROPHILS NFR BLD AUTO: 66 %
NITRITE UR QL STRIP: NEGATIVE
PH UR STRIP: 5.5 [PH] (ref 5–7.5)
PLATELET # BLD AUTO: 439 X10E3/UL (ref 150–379)
PROT UR QL STRIP: NEGATIVE
RBC # BLD AUTO: 4.12 X10E6/UL (ref 3.77–5.28)
RBC #/AREA URNS HPF: NORMAL /HPF
SP GR UR: 1.02 (ref 1–1.03)
TIBC SERPL-MCNC: 321 UG/DL (ref 250–450)
TSH SERPL DL<=0.005 MIU/L-ACNC: 0.61 UIU/ML (ref 0.45–4.5)
UIBC SERPL-MCNC: 282 UG/DL (ref 118–369)
URINALYSIS REFLEX: NORMAL
UROBILINOGEN UR STRIP-MCNC: 0.2 MG/DL (ref 0.2–1)
VIT B12 SERPL-MCNC: 729 PG/ML (ref 232–1245)
WBC # BLD AUTO: 8.3 X10E3/UL (ref 3.4–10.8)
WBC #/AREA URNS HPF: NORMAL /HPF

## 2019-04-22 ENCOUNTER — OFFICE VISIT (OUTPATIENT)
Dept: SPORTS MEDICINE | Facility: CLINIC | Age: 69
End: 2019-04-22

## 2019-04-22 VITALS
SYSTOLIC BLOOD PRESSURE: 172 MMHG | HEIGHT: 69 IN | DIASTOLIC BLOOD PRESSURE: 90 MMHG | WEIGHT: 293 LBS | BODY MASS INDEX: 43.4 KG/M2

## 2019-04-22 DIAGNOSIS — M17.0 PRIMARY OSTEOARTHRITIS OF BOTH KNEES: Primary | ICD-10-CM

## 2019-04-22 PROCEDURE — 20611 DRAIN/INJ JOINT/BURSA W/US: CPT | Performed by: FAMILY MEDICINE

## 2019-04-22 NOTE — PROGRESS NOTES
Here today for Orthovisc injection, # 3/3    Knee Injection Procedure Note     Bilateral knee injection was discussed with the patient in detail, including indication, risks, benefits, and alternatives. Verbal consent was given for the procedure. Injection was performed by physician.  Injection site was identified and cleaned with Betadine and alcohol swabs. Prior to needle insertion, ethyl chloride spray was used for surface anesthesia. Sterile technique was used.  Ultrasound guidance was used for the procedure for confirmation of needle placement and patient comfort.    Orthovisc was passed into the joint space without difficulty. The needle was removed and a simple bandage was applied. The procedure was tolerated well without difficulty.    Diagnoses and all orders for this visit:    Primary osteoarthritis of both knees  -     Hyaluronan (ORTHOVISC) injection 30 mg  -     Hyaluronan (ORTHOVISC) injection 30 mg    Tolerated well; already showing signs of benefit from this viscosupplementation series. Discussed expectations moving forward. She can follow up as needed based on her response.

## 2019-05-01 RX ORDER — IBUPROFEN 200 MG
200 TABLET ORAL EVERY 6 HOURS PRN
COMMUNITY
End: 2022-12-27 | Stop reason: SDUPTHER

## 2019-05-01 RX ORDER — HYDROCODONE BITARTRATE AND ACETAMINOPHEN 5; 325 MG/1; MG/1
1 TABLET ORAL EVERY 6 HOURS PRN
COMMUNITY
End: 2019-06-17

## 2019-05-01 RX ORDER — ACETAMINOPHEN 500 MG
500 TABLET ORAL EVERY 6 HOURS PRN
COMMUNITY
End: 2021-10-04

## 2019-05-02 DIAGNOSIS — D50.9 IRON DEFICIENCY ANEMIA, UNSPECIFIED IRON DEFICIENCY ANEMIA TYPE: Primary | ICD-10-CM

## 2019-05-02 NOTE — PROGRESS NOTES
Pt called and iron deficiency anemia discussed.  Pt advised to start an OTC iron supplement daily.  Will also refer to GI for colonoscopy.  Pt reports that it has been at least 5 yr if not longer since her last colonoscopy.  Diabetes well controlled.

## 2019-05-06 ENCOUNTER — HOSPITAL ENCOUNTER (OUTPATIENT)
Dept: ULTRASOUND IMAGING | Facility: HOSPITAL | Age: 69
Discharge: HOME | End: 2019-05-06
Admitting: INTERNAL MEDICINE

## 2019-05-06 VITALS
BODY MASS INDEX: 44.73 KG/M2 | TEMPERATURE: 97.9 F | DIASTOLIC BLOOD PRESSURE: 73 MMHG | HEIGHT: 67 IN | RESPIRATION RATE: 16 BRPM | OXYGEN SATURATION: 99 % | SYSTOLIC BLOOD PRESSURE: 158 MMHG | HEART RATE: 58 BPM | WEIGHT: 285 LBS

## 2019-05-06 DIAGNOSIS — E04.1 THYROID NODULE: ICD-10-CM

## 2019-05-06 LAB
INR PPP: 1.4 (ref 0.8–1.2)
PROTHROMBIN TIME: 16 SECONDS (ref 12.8–15.2)

## 2019-05-06 PROCEDURE — 88305 TISSUE EXAM BY PATHOLOGIST: CPT | Performed by: OTOLARYNGOLOGY

## 2019-05-06 PROCEDURE — 76942 ECHO GUIDE FOR BIOPSY: CPT

## 2019-05-06 PROCEDURE — 85610 PROTHROMBIN TIME: CPT

## 2019-05-06 PROCEDURE — 88173 CYTOPATH EVAL FNA REPORT: CPT | Performed by: OTOLARYNGOLOGY

## 2019-05-06 RX ORDER — CHLORHEXIDINE GLUCONATE 0.12 MG/ML
RINSE ORAL
Refills: 0 | COMMUNITY
Start: 2019-04-22 | End: 2021-02-08

## 2019-05-06 RX ORDER — LIDOCAINE HYDROCHLORIDE 10 MG/ML
10 INJECTION, SOLUTION INFILTRATION; PERINEURAL ONCE
Status: COMPLETED | OUTPATIENT
Start: 2019-05-06 | End: 2019-05-06

## 2019-05-06 RX ADMIN — LIDOCAINE HYDROCHLORIDE 4 ML: 10 INJECTION, SOLUTION INFILTRATION; PERINEURAL at 13:00

## 2019-05-06 NOTE — DISCHARGE INSTRUCTIONS
EDUCATION / DISCHARGE INSTRUCTIONS  Aspiration:  An aspiration is a procedure used to take a sample of cells or tissue from a lump, mass or other area of concern.   Within a week the radiologist will send a report to your physician.  A pathologist will also examine the tissue and send a report.  THIS EDUCATION INFORMATION WAS REVIEWED PRIOR TO THE PROCEDURE AND CONSENT.  Patient initials_________________Time__1125______________      Post Procedure:    *  Rest today (no pushing pulling or straining).   *  Slowly increase activity tomorow.    *  If you received sedation do not drive for 24 hours.   *  Keep dressing clean and dry.   *  Leave dressing on puncture site for 24 hours.    *  You may shower when dressing removed.   *  If needed, use an ice pack at the site for up to 20 minutes at a time for pain.    Call your doctor if experiencing:   *  Signs of infection such as redness, swelling, excessive pain and / or foul smelling drainage from the puncture site.   *  Chills or fever over 101 degrees (by mouth).   *  Unrelieved pain.   *  Any new or severe symptoms.      Following the procedure:     Follow-up with the ordering physician as directed.    Continue to take other medications as directed by your physician unless  otherwise instructed.   If applicable, resume taking your blood thinners or Aspirin on _5/7/19 after I:30 pm._     If you have any concerns please call the Radiology Nurses Desk at [phone].  You are the most important factor in your recovery.  Follow the above instructions carefully.

## 2019-05-06 NOTE — NURSING NOTE
Verbal and written D/C instructions given to patient and she voices understanding and is able to teach back D/C instructions.

## 2019-05-07 ENCOUNTER — TELEPHONE (OUTPATIENT)
Dept: INTERVENTIONAL RADIOLOGY/VASCULAR | Facility: HOSPITAL | Age: 69
End: 2019-05-07

## 2019-05-07 LAB
CYTO UR: NORMAL
LAB AP CASE REPORT: NORMAL
LAB AP DIAGNOSIS COMMENT: NORMAL
LAB AP NON-GYN SPECIMEN ADEQUACY: NORMAL
PATH REPORT.FINAL DX SPEC: NORMAL
PATH REPORT.GROSS SPEC: NORMAL

## 2019-06-17 ENCOUNTER — OFFICE VISIT (OUTPATIENT)
Dept: GASTROENTEROLOGY | Facility: CLINIC | Age: 69
End: 2019-06-17

## 2019-06-17 VITALS
DIASTOLIC BLOOD PRESSURE: 78 MMHG | BODY MASS INDEX: 45.99 KG/M2 | HEIGHT: 67 IN | WEIGHT: 293 LBS | TEMPERATURE: 98.5 F | SYSTOLIC BLOOD PRESSURE: 146 MMHG

## 2019-06-17 DIAGNOSIS — Z12.11 COLON CANCER SCREENING: Primary | ICD-10-CM

## 2019-06-17 DIAGNOSIS — D64.9 NORMOCYTIC ANEMIA: ICD-10-CM

## 2019-06-17 PROCEDURE — 99204 OFFICE O/P NEW MOD 45 MIN: CPT | Performed by: INTERNAL MEDICINE

## 2019-06-17 RX ORDER — SODIUM CHLORIDE, SODIUM LACTATE, POTASSIUM CHLORIDE, CALCIUM CHLORIDE 600; 310; 30; 20 MG/100ML; MG/100ML; MG/100ML; MG/100ML
30 INJECTION, SOLUTION INTRAVENOUS CONTINUOUS
Status: CANCELLED | OUTPATIENT
Start: 2019-08-05

## 2019-06-17 NOTE — PATIENT INSTRUCTIONS
Continue the iron pills    Schedule scopes    For any additional questions, concerns or changes to your condition after today's office visit please contact the office at [phone].

## 2019-06-17 NOTE — PROGRESS NOTES
Chief Complaint   Patient presents with   • Abnormal Lab     anemia       Subjective     HPI    Terra Peoples is a 69 y.o. female with a past medical history noted below who presents for evaluation of anemia.  This has been a chronic issue since 2015.  It is mild in severity. It is normocytic in quality.  The lowest her Hb got to was 10.6 in March this year.  April iron studies showed a ferritin of 84.  She occasionally has some mild fatigue but denies any significant associated symptoms.  She has been on iron pills from her PCP for about a month.  She denies that she sees any overt bleeding.  He denies a prior history of anemia.  She denies a family history of anemia.  She denies a family history of any GI malignancies.  She reports that her last colonoscopy was normal but that it was greater than 10 years ago.  She reports that she has a normal diet and eats red meat as well as beans and vegetables.  She does not smoke nor drink alcohol excessively.  She has had a hernia repair.  She denies excess NSAID use.        Past Medical History:   Diagnosis Date   • Anemia     iron deficiency   • Arthritis    • Chronic knee pain    • Diabetes mellitus (CMS/HCC)    • Edema    • Hyperlipidemia     developed statin related myalgias   • Hypertension    • Hypothyroidism    • Low back pain    • Multinodular goiter    • Overactive bladder    • Stress incontinence of urine    • Thyroid nodule    • Venous stasis dermatitis          Current Outpatient Medications:   •  acetaminophen (TYLENOL) 500 MG tablet, Take 500 mg by mouth Every 6 (Six) Hours As Needed for Mild Pain ., Disp: , Rfl:   •  amLODIPine (NORVASC) 10 MG tablet, Take 0.5 tablets by mouth Daily., Disp: 90 tablet, Rfl: 1  •  chlorhexidine (PERIDEX) 0.12 % solution, SWISH AND SPIT 15 ML BID, Disp: , Rfl: 0  •  Ferrous Sulfate 27 MG tablet, Take 27 mg by mouth Daily With Breakfast., Disp: , Rfl:   •  glipiZIDE (GLUCOTROL XL) 10 MG 24 hr tablet, Take 1 tablet by mouth  Daily., Disp: 90 tablet, Rfl: 3  •  glucose blood test strip, OneTouch Ultra Blue In Vitro Strip; Patient Sig: OneTouch Ultra Blue In Vitro Strip TO TEST FOUR TIMES DAILY AND AS NEEDED; 2; 6; 28-Oct-2014; Active, Disp: , Rfl:   •  Liniments (BLUE-EMU SUPER STRENGTH EX), Apply  topically As Needed., Disp: , Rfl:   •  lisinopril (PRINIVIL,ZESTRIL) 40 MG tablet, Take 1 tablet by mouth Daily., Disp: 90 tablet, Rfl: 3  •  metFORMIN (GLUCOPHAGE) 1000 MG tablet, Take 1 tablet by mouth 2 (Two) Times a Day., Disp: 180 tablet, Rfl: 3  •  pioglitazone (ACTOS) 45 MG tablet, Take 1 tablet by mouth Daily., Disp: 90 tablet, Rfl: 3  •  Pumpkin Seed-Soy Germ (AZO BLADDER CONTROL/GO-LESS PO), Take  by mouth Daily., Disp: , Rfl:   •  Cholecalciferol 1000 units capsule, Take 1 capsule by mouth Daily., Disp: 90 capsule, Rfl: 3  •  diclofenac (VOLTAREN) 1 % gel gel, Apply 4 g topically to the appropriate area as directed 4 (Four) Times a Day As Needed (joint pain)., Disp: 500 g, Rfl: 3  •  ibuprofen (ADVIL,MOTRIN) 200 MG tablet, Take 200 mg by mouth Every 6 (Six) Hours As Needed for Mild Pain ., Disp: , Rfl:     No Known Allergies    Social History     Socioeconomic History   • Marital status:      Spouse name: Not on file   • Number of children: Not on file   • Years of education: Not on file   • Highest education level: Not on file   Tobacco Use   • Smoking status: Never Smoker   • Smokeless tobacco: Never Used   Substance and Sexual Activity   • Alcohol use: No   • Drug use: No   • Sexual activity: No   Social History Narrative    She lives at home with one adult son.  Retired from .         Family History   Problem Relation Age of Onset   • No Known Problems Mother    • No Known Problems Father    • No Known Problems Maternal Grandmother    • No Known Problems Maternal Grandfather    • No Known Problems Paternal Grandmother    • No Known Problems Paternal Grandfather        Review of Systems   Constitutional: Negative  for activity change, appetite change and fatigue.   HENT: Negative for sore throat and trouble swallowing.    Respiratory: Negative.    Cardiovascular: Negative.    Gastrointestinal: Negative for abdominal distention, abdominal pain and blood in stool.   Endocrine: Negative for cold intolerance and heat intolerance.   Genitourinary: Negative for difficulty urinating, dysuria and frequency.   Musculoskeletal: Positive for gait problem. Negative for arthralgias, back pain and myalgias.   Skin: Negative.    Hematological: Negative for adenopathy. Does not bruise/bleed easily.   All other systems reviewed and are negative.      Objective     Vitals:    06/17/19 0851   BP: 146/78   Temp: 98.5 °F (36.9 °C)         06/17/19  0851   Weight: (!) 138 kg (304 lb 12.8 oz)     Body mass index is 47.74 kg/m².    Physical Exam   Constitutional: She is oriented to person, place, and time. She appears well-developed and well-nourished. No distress.   obese   HENT:   Head: Normocephalic and atraumatic.   Right Ear: External ear normal.   Left Ear: External ear normal.   Nose: Nose normal.   Mouth/Throat: Oropharynx is clear and moist.   Eyes: Conjunctivae and EOM are normal. Right eye exhibits no discharge. Left eye exhibits no discharge. No scleral icterus.   Neck: Normal range of motion. Neck supple. No thyromegaly present.   No supraclavicular adenopathy   Cardiovascular: Normal rate, regular rhythm, normal heart sounds and intact distal pulses. Exam reveals no gallop.   No murmur heard.  No lower extremity edema   Pulmonary/Chest: Effort normal and breath sounds normal. No respiratory distress. She has no wheezes.   Abdominal: Soft. Normal appearance and bowel sounds are normal. She exhibits no distension and no mass. There is no hepatosplenomegaly. There is no tenderness. There is no rigidity, no rebound and no guarding. No hernia.   Genitourinary:   Genitourinary Comments: Rectal exam deferred   Musculoskeletal: Normal range of  motion. She exhibits no edema or tenderness.   No atrophy of upper or lower extremities.  Normal digits and nails of both hands. Antalgic gait, uses cane   Lymphadenopathy:     She has no cervical adenopathy.   Neurological: She is alert and oriented to person, place, and time. She displays no atrophy. Coordination normal.   Skin: Skin is warm and dry. No rash noted. She is not diaphoretic. No erythema.   Psychiatric: She has a normal mood and affect. Her behavior is normal. Judgment and thought content normal.   Vitals reviewed.      WBC   Date Value Ref Range Status   04/15/2019 8.3 3.4 - 10.8 x10E3/uL Final   06/05/2018 6.43 4.5 - 11.0 10*3/uL Final     RBC   Date Value Ref Range Status   04/15/2019 4.12 3.77 - 5.28 x10E6/uL Final   06/05/2018 3.94 (L) 4.0 - 5.2 10*6/uL Final     Hemoglobin   Date Value Ref Range Status   04/15/2019 11.4 11.1 - 15.9 g/dL Final   06/05/2018 11.0 (L) 12.0 - 16.0 g/dL Final     Hematocrit   Date Value Ref Range Status   04/15/2019 35.7 34.0 - 46.6 % Final   06/05/2018 35.7 (L) 36.0 - 46.0 % Final     MCV   Date Value Ref Range Status   04/15/2019 87 79 - 97 fL Final   06/05/2018 90.6 80.0 - 100.0 fL Final     MCH   Date Value Ref Range Status   04/15/2019 27.7 26.6 - 33.0 pg Final   06/05/2018 27.9 26.0 - 34.0 pg Final     MCHC   Date Value Ref Range Status   04/15/2019 31.9 31.5 - 35.7 g/dL Final   06/05/2018 30.8 (L) 31.0 - 37.0 g/dL Final     RDW   Date Value Ref Range Status   04/15/2019 14.1 12.3 - 15.4 % Final   06/05/2018 13.9 12.0 - 16.8 % Final     RDW-SD   Date Value Ref Range Status   03/27/2017 44.0 37.0 - 54.0 fl Final     MPV   Date Value Ref Range Status   06/05/2018 10.4 6.7 - 10.8 fL Final     Platelets   Date Value Ref Range Status   04/15/2019 439 (H) 150 - 379 x10E3/uL Final   06/05/2018 384 140 - 440 10*3/uL Final     Neutrophil Rel %   Date Value Ref Range Status   04/15/2019 66 Not Estab. % Final   06/05/2018 57.2 45 - 80 % Final     Lymphocyte Rel %   Date  Value Ref Range Status   04/15/2019 25 Not Estab. % Final   06/05/2018 35.6 15 - 50 % Final     Monocyte Rel %   Date Value Ref Range Status   04/15/2019 7 Not Estab. % Final   06/05/2018 5.9 0 - 15 % Final     Eosinophil Rel %   Date Value Ref Range Status   04/15/2019 2 Not Estab. % Final     Eosinophil %   Date Value Ref Range Status   06/05/2018 0.9 0 - 7 % Final     Basophil Rel %   Date Value Ref Range Status   04/15/2019 0 Not Estab. % Final   06/05/2018 0.2 0 - 2 % Final     Immature Grans %   Date Value Ref Range Status   06/05/2018 0.2 (H) 0 % Final     Neutrophils Absolute   Date Value Ref Range Status   04/15/2019 5.4 1.4 - 7.0 x10E3/uL Final   06/05/2018 3.68 2.0 - 8.8 10*3/uL Final     Lymphocytes Absolute   Date Value Ref Range Status   04/15/2019 2.1 0.7 - 3.1 x10E3/uL Final   06/05/2018 2.29 0.7 - 5.5 10*3/uL Final     Monocytes Absolute   Date Value Ref Range Status   04/15/2019 0.6 0.1 - 0.9 x10E3/uL Final   06/05/2018 0.38 0.0 - 1.7 10*3/uL Final     Eosinophils Absolute   Date Value Ref Range Status   04/15/2019 0.2 0.0 - 0.4 x10E3/uL Final   06/05/2018 0.06 0.0 - 0.8 10*3/uL Final     Basophils Absolute   Date Value Ref Range Status   04/15/2019 0.0 0.0 - 0.2 x10E3/uL Final   06/05/2018 0.01 0.0 - 0.2 10*3/uL Final     Immature Grans, Absolute   Date Value Ref Range Status   06/05/2018 0.01 <1 10*3/uL Final     nRBC   Date Value Ref Range Status   06/05/2018 0 0 /100(WBC) Final       Glucose   Date Value Ref Range Status   02/19/2018 53 (L) 65 - 99 mg/dL Final     Sodium   Date Value Ref Range Status   03/11/2019 145 (H) 134 - 144 mmol/L Final   06/05/2018 145 137 - 145 mmol/L Final     Potassium   Date Value Ref Range Status   03/11/2019 4.1 3.5 - 5.2 mmol/L Final   06/05/2018 4.0 3.5 - 5.1 mmol/L Final     CO2   Date Value Ref Range Status   06/05/2018 29 22 - 30 mmol/L Final     Total CO2   Date Value Ref Range Status   03/11/2019 26 20 - 29 mmol/L Final     Chloride   Date Value Ref Range  Status   03/11/2019 104 96 - 106 mmol/L Final   06/05/2018 106 98 - 107 mmol/L Final     Anion Gap   Date Value Ref Range Status   02/19/2018 12.3 mmol/L Final     Creatinine   Date Value Ref Range Status   03/11/2019 0.93 0.57 - 1.00 mg/dL Final   06/05/2018 0.9 0.7 - 1.5 mg/dL Final     BUN   Date Value Ref Range Status   03/11/2019 10 8 - 27 mg/dL Final   06/05/2018 14 7 - 20 mg/dL Final     BUN/Creatinine Ratio   Date Value Ref Range Status   03/11/2019 11 (L) 12 - 28 Final   06/05/2018 15.6 RATIO Final     Calcium   Date Value Ref Range Status   03/11/2019 10.1 8.7 - 10.3 mg/dL Final   06/05/2018 10.2 8.4 - 10.2 mg/dL Final     eGFR Non  Am   Date Value Ref Range Status   03/11/2019 64 >59 mL/min/1.73 Final     Alkaline Phosphatase   Date Value Ref Range Status   03/11/2019 83 39 - 117 IU/L Final   06/05/2018 89 38 - 126 U/L Final     Total Protein   Date Value Ref Range Status   06/05/2018 7.8 6.3 - 8.2 g/dL Final     ALT (SGPT)   Date Value Ref Range Status   03/11/2019 12 0 - 32 IU/L Final   06/05/2018 22 13 - 69 U/L Final     AST (SGOT)   Date Value Ref Range Status   03/11/2019 12 0 - 40 IU/L Final   06/05/2018 15 15 - 46 U/L Final     Total Bilirubin   Date Value Ref Range Status   03/11/2019 <0.2 0.0 - 1.2 mg/dL Final   06/05/2018 0.3 0.2 - 1.3 mg/dL Final     Albumin   Date Value Ref Range Status   03/11/2019 4.2 3.6 - 4.8 g/dL Final   06/05/2018 3.9 3.5 - 5.0 g/dL Final     Globulin   Date Value Ref Range Status   06/05/2018 3.9 1.5 - 4.5 g/dL Final     A/G Ratio   Date Value Ref Range Status   03/11/2019 1.5 1.2 - 2.2 Final   06/05/2018 1.0 (L) 1.1 - 2.5 RATIO Final         Imaging Results (last 7 days)     ** No results found for the last 168 hours. **            No notes on file    Assessment/Plan    Normocytic anemia: chronic x 4 years, mild.  Normal ferritin    Colon cancer screening: no recent endoscopy    Plan  Will proceed with colonoscopy for screening, anemia.  EGD at the same time for  her anemia.  We discussed the procedure, prep.  She verbalizes understanding and agrees to proceed.  Further recommendations after endoscopy.    Terra was seen today for abnormal lab.    Diagnoses and all orders for this visit:    Colon cancer screening  -     Case Request; Standing  -     Follow Anesthesia Guidelines / Standing Orders; Future  -     Obtain Informed Consent; Future  -     Implement Anesthesia Orders Day of Procedure; Standing  -     Obtain Informed Consent; Standing  -     lactated ringers infusion  -     Case Request    Normocytic anemia  -     Case Request; Standing  -     Follow Anesthesia Guidelines / Standing Orders; Future  -     Obtain Informed Consent; Future  -     Implement Anesthesia Orders Day of Procedure; Standing  -     Obtain Informed Consent; Standing  -     lactated ringers infusion  -     Case Request        I have discussed the above plan with the patient.  They verbalize understanding and are in agreement with the plan.  They have been advised to contact the office for any questions, concerns, or changes related to their health.    Dictated utilizing Dragon dictation

## 2019-08-05 ENCOUNTER — ANESTHESIA (OUTPATIENT)
Dept: GASTROENTEROLOGY | Facility: HOSPITAL | Age: 69
End: 2019-08-05

## 2019-08-05 ENCOUNTER — HOSPITAL ENCOUNTER (OUTPATIENT)
Facility: HOSPITAL | Age: 69
Setting detail: HOSPITAL OUTPATIENT SURGERY
Discharge: HOME | End: 2019-08-05
Attending: INTERNAL MEDICINE | Admitting: INTERNAL MEDICINE

## 2019-08-05 ENCOUNTER — ANESTHESIA EVENT (OUTPATIENT)
Dept: GASTROENTEROLOGY | Facility: HOSPITAL | Age: 69
End: 2019-08-05

## 2019-08-05 VITALS
BODY MASS INDEX: 43.4 KG/M2 | WEIGHT: 293 LBS | HEIGHT: 69 IN | OXYGEN SATURATION: 100 % | DIASTOLIC BLOOD PRESSURE: 78 MMHG | HEART RATE: 68 BPM | SYSTOLIC BLOOD PRESSURE: 162 MMHG | RESPIRATION RATE: 16 BRPM | TEMPERATURE: 99 F

## 2019-08-05 DIAGNOSIS — D64.9 NORMOCYTIC ANEMIA: ICD-10-CM

## 2019-08-05 DIAGNOSIS — Z12.11 COLON CANCER SCREENING: ICD-10-CM

## 2019-08-05 LAB — GLUCOSE BLDC GLUCOMTR-MCNC: 83 MG/DL (ref 70–130)

## 2019-08-05 PROCEDURE — 45380 COLONOSCOPY AND BIOPSY: CPT | Performed by: INTERNAL MEDICINE

## 2019-08-05 PROCEDURE — 43239 EGD BIOPSY SINGLE/MULTIPLE: CPT | Performed by: INTERNAL MEDICINE

## 2019-08-05 PROCEDURE — 88305 TISSUE EXAM BY PATHOLOGIST: CPT | Performed by: INTERNAL MEDICINE

## 2019-08-05 PROCEDURE — 25010000002 PROPOFOL 10 MG/ML EMULSION: Performed by: ANESTHESIOLOGY

## 2019-08-05 PROCEDURE — S0260 H&P FOR SURGERY: HCPCS | Performed by: INTERNAL MEDICINE

## 2019-08-05 PROCEDURE — 82962 GLUCOSE BLOOD TEST: CPT

## 2019-08-05 RX ORDER — LIDOCAINE HYDROCHLORIDE 20 MG/ML
INJECTION, SOLUTION INFILTRATION; PERINEURAL AS NEEDED
Status: DISCONTINUED | OUTPATIENT
Start: 2019-08-05 | End: 2019-08-05 | Stop reason: SURG

## 2019-08-05 RX ORDER — SODIUM CHLORIDE 0.9 % (FLUSH) 0.9 %
3-10 SYRINGE (ML) INJECTION AS NEEDED
Status: DISCONTINUED | OUTPATIENT
Start: 2019-08-05 | End: 2019-08-05 | Stop reason: HOSPADM

## 2019-08-05 RX ORDER — PROPOFOL 10 MG/ML
VIAL (ML) INTRAVENOUS AS NEEDED
Status: DISCONTINUED | OUTPATIENT
Start: 2019-08-05 | End: 2019-08-05 | Stop reason: SURG

## 2019-08-05 RX ORDER — SODIUM CHLORIDE 0.9 % (FLUSH) 0.9 %
3 SYRINGE (ML) INJECTION EVERY 12 HOURS SCHEDULED
Status: DISCONTINUED | OUTPATIENT
Start: 2019-08-05 | End: 2019-08-05 | Stop reason: HOSPADM

## 2019-08-05 RX ORDER — SODIUM CHLORIDE, SODIUM LACTATE, POTASSIUM CHLORIDE, CALCIUM CHLORIDE 600; 310; 30; 20 MG/100ML; MG/100ML; MG/100ML; MG/100ML
30 INJECTION, SOLUTION INTRAVENOUS CONTINUOUS
Status: DISCONTINUED | OUTPATIENT
Start: 2019-08-05 | End: 2019-08-05 | Stop reason: HOSPADM

## 2019-08-05 RX ADMIN — SODIUM CHLORIDE, POTASSIUM CHLORIDE, SODIUM LACTATE AND CALCIUM CHLORIDE 30 ML/HR: 600; 310; 30; 20 INJECTION, SOLUTION INTRAVENOUS at 13:55

## 2019-08-05 RX ADMIN — SODIUM CHLORIDE, POTASSIUM CHLORIDE, SODIUM LACTATE AND CALCIUM CHLORIDE: 600; 310; 30; 20 INJECTION, SOLUTION INTRAVENOUS at 15:30

## 2019-08-05 RX ADMIN — PROPOFOL 150 MG: 10 INJECTION, EMULSION INTRAVENOUS at 16:00

## 2019-08-05 RX ADMIN — LIDOCAINE HYDROCHLORIDE 40 MG: 20 INJECTION, SOLUTION INFILTRATION; PERINEURAL at 15:30

## 2019-08-05 RX ADMIN — PROPOFOL 100 MG: 10 INJECTION, EMULSION INTRAVENOUS at 15:45

## 2019-08-05 RX ADMIN — PROPOFOL 140 MG: 10 INJECTION, EMULSION INTRAVENOUS at 15:30

## 2019-08-05 NOTE — ANESTHESIA POSTPROCEDURE EVALUATION
Patient: Terra Peoples    Procedure Summary     Date:  08/05/19 Room / Location:  [facility] ENDOSCOPY 7 /  [facility]    Anesthesia Start:  1530 Anesthesia Stop:  1612    Procedures:       COLONOSCOPY to cecum and TI:  cold biopsy polypectomyies (N/A )      ESOPHAGOGASTRODUODENOSCOPY with biopsies, (N/A Esophagus) Diagnosis:       Colon cancer screening      Normocytic anemia      (Colon cancer screening [Z12.11])      (Normocytic anemia [D64.9])    Surgeon:  Nelsy Vila MD Provider:  Vincent James MD    Anesthesia Type:  MAC ASA Status:  4          Anesthesia Type: MAC  Last vitals  BP   164/80 (08/05/19 1351)   Temp   37.2 °C (99 °F) (08/05/19 1351)   Pulse   80 (08/05/19 1351)   Resp   16 (08/05/19 1351)     SpO2   99 % (08/05/19 1351)     Post Anesthesia Care and Evaluation    Patient location during evaluation: PACU  Patient participation: complete - patient participated  Level of consciousness: awake and alert  Pain management: adequate  Airway patency: patent  Anesthetic complications: No anesthetic complications    Cardiovascular status: acceptable  Respiratory status: acceptable  Hydration status: acceptable    Comments: -------------------------              08/05/19 1351        -------------------------   BP:         164/80        Pulse:        80          Resp:         16          Temp:   37.2 °C (99 °F)   SpO2:         99%        -------------------------

## 2019-08-05 NOTE — H&P
[facility]  Pre Procedure History & Physical    Chief Complaint: Anemia, colon cancer screening      HPI:   69 y.o. female with a past medical history noted below who presents for evaluation of anemia.  This has been a chronic issue since 2015.  It is mild in severity. It is normocytic in quality.  The lowest her Hb got to was 10.6 in March this year.  April iron studies showed a ferritin of 84.  She occasionally has some mild fatigue but denies any significant associated symptoms.  She has been on iron pills from her PCP for about a month.  She denies that she sees any overt bleeding.  He denies a prior history of anemia.  She denies a family history of anemia.  She denies a family history of any GI malignancies.  She reports that her last colonoscopy was normal but that it was greater than 10 years ago.  She reports that she has a normal diet and eats red meat as well as beans and vegetables.  She does not smoke nor drink alcohol excessively.  She has had a hernia repair.  She denies excess NSAID use.            Past Medical History:   Past Medical History:   Diagnosis Date   • Anemia     iron deficiency   • Arthritis    • Chronic knee pain    • Diabetes mellitus (CMS/HCC)    • Edema    • Hyperlipidemia     developed statin related myalgias   • Hypertension    • Hypothyroidism    • Low back pain    • Multinodular goiter    • Overactive bladder    • Stress incontinence of urine    • Thyroid nodule    • Venous stasis dermatitis        Family History:  Family History   Problem Relation Age of Onset   • No Known Problems Mother    • No Known Problems Father    • No Known Problems Maternal Grandmother    • No Known Problems Maternal Grandfather    • No Known Problems Paternal Grandmother    • No Known Problems Paternal Grandfather        Social History:   reports that she has never smoked. She has never used smokeless tobacco. She reports that she does not drink alcohol or use  drugs.    Medications:   No medications prior to admission.       Allergies:  Patient has no known allergies.    ROS:    Pertinent items are noted in HPI     Objective     not currently breastfeeding.    Physical Exam   Constitutional: Pt is oriented to person, place, and time and well-developed, well-nourished, and in no distress.   HENT:   Mouth/Throat: Oropharynx is clear and moist.   Neck: Normal range of motion. Neck supple.   Cardiovascular: Normal rate, regular rhythm and normal heart sounds.    Pulmonary/Chest: Effort normal and breath sounds normal. No respiratory distress. No  wheezes.   Abdominal: Soft. Bowel sounds are normal.   Skin: Skin is warm and dry.   Psychiatric: Mood, memory, affect and judgment normal.     Assessment/Plan     Diagnosis: Anemia, colon cancer screening      Anticipated Surgical Procedure:  EGD  Colonoscopy    The risks, benefits, and alternatives of this procedure have been discussed with the patient or the responsible party- the patient understands and agrees to proceed.

## 2019-08-05 NOTE — DISCHARGE INSTRUCTIONS
For the next 24 hours patient needs to be with a responsible adult.    For 24 hours DO NOT drive, operate machinery, appliances, drink alcohol, make important decisions or sign legal documents.    Start with a light or bland diet if you are feeling sick to your stomach otherwise advance to regular diet as tolerated.    Follow recommendations on procedure report if provided by your doctor.    Call Dr Vila for problems [phone]    Problems may include but not limited to: large amounts of bleeding, trouble breathing, repeated vomiting, severe unrelieved pain, fever or chills.

## 2019-08-07 LAB
CYTO UR: NORMAL
LAB AP CASE REPORT: NORMAL
PATH REPORT.FINAL DX SPEC: NORMAL
PATH REPORT.GROSS SPEC: NORMAL

## 2019-08-09 NOTE — PROGRESS NOTES
Biopsies from her EGD show some mild inflammation, no bacterial infection.    The ellipse of her colon were a mix of tubular adenomas and hyperplastic polyps    Her next colonoscopy should be in 5 years, please place in recall

## 2019-08-12 ENCOUNTER — TELEPHONE (OUTPATIENT)
Dept: GASTROENTEROLOGY | Facility: CLINIC | Age: 69
End: 2019-08-12

## 2019-08-12 NOTE — TELEPHONE ENCOUNTER
----- Message from Nelsy Vila MD sent at 8/9/2019  5:14 PM EDT -----  Biopsies from her EGD show some mild inflammation, no bacterial infection.    The ellipse of her colon were a mix of tubular adenomas and hyperplastic polyps    Her next colonoscopy should be in 5 years, please place in recall

## 2019-08-12 NOTE — TELEPHONE ENCOUNTER
Called pt and advised per Dr Vila that the bx from her egd showed some mild inflammation , no bacterial infection.      The bx of her colon were a mix of benign an d precancerous polyps.  She recommends a repeat c/s in 5 yrs. Pt verb understanding.     C/s placed in recall for 08/05/2024.

## 2020-03-09 ENCOUNTER — OFFICE VISIT (OUTPATIENT)
Dept: FAMILY MEDICINE CLINIC | Facility: CLINIC | Age: 70
End: 2020-03-09

## 2020-03-09 VITALS
RESPIRATION RATE: 15 BRPM | DIASTOLIC BLOOD PRESSURE: 78 MMHG | HEIGHT: 69 IN | HEART RATE: 92 BPM | SYSTOLIC BLOOD PRESSURE: 146 MMHG | BODY MASS INDEX: 43.4 KG/M2 | WEIGHT: 293 LBS | TEMPERATURE: 96.8 F | OXYGEN SATURATION: 99 %

## 2020-03-09 DIAGNOSIS — I10 ESSENTIAL HYPERTENSION: Primary | ICD-10-CM

## 2020-03-09 DIAGNOSIS — N32.81 OAB (OVERACTIVE BLADDER): ICD-10-CM

## 2020-03-09 DIAGNOSIS — E11.9 DIABETES MELLITUS TYPE 2, NONINSULIN DEPENDENT (HCC): ICD-10-CM

## 2020-03-09 DIAGNOSIS — M25.562 CHRONIC PAIN OF BOTH KNEES: ICD-10-CM

## 2020-03-09 DIAGNOSIS — G89.29 CHRONIC PAIN OF BOTH KNEES: ICD-10-CM

## 2020-03-09 DIAGNOSIS — E66.01 CLASS 3 SEVERE OBESITY DUE TO EXCESS CALORIES WITH SERIOUS COMORBIDITY AND BODY MASS INDEX (BMI) OF 45.0 TO 49.9 IN ADULT (HCC): ICD-10-CM

## 2020-03-09 DIAGNOSIS — E78.2 MIXED HYPERLIPIDEMIA: ICD-10-CM

## 2020-03-09 DIAGNOSIS — M25.561 CHRONIC PAIN OF BOTH KNEES: ICD-10-CM

## 2020-03-09 DIAGNOSIS — G62.9 NEUROPATHY: ICD-10-CM

## 2020-03-09 PROCEDURE — 99214 OFFICE O/P EST MOD 30 MIN: CPT | Performed by: NURSE PRACTITIONER

## 2020-03-09 RX ORDER — GABAPENTIN 100 MG/1
CAPSULE ORAL
COMMUNITY
Start: 2020-02-13 | End: 2021-10-04 | Stop reason: SDUPTHER

## 2020-03-09 NOTE — PROGRESS NOTES
Subjective pt of Dr Eliza Calhouna Richelle is a 69 y.o. female.     History of Present Illness   Terra is present today for back and leg pain and a refill on her medications.  Chronic issues include hypertension, diabetes mellitus,and neuropathy, bilateral knee pain.  Has been having trouble with her bladder  She seems to be stable on her medicines.    Since her last visit she had a cerebral infarction.The following portions of the patient's history were reviewed and updated as appropriate: allergies, current medications, past family history, past medical history, past social history, past surgical history and problem list.    Review of Systems   Constitutional: Positive for activity change. Negative for appetite change and fever.   Respiratory: Negative for cough and shortness of breath.    Cardiovascular: Negative for chest pain and leg swelling.   Musculoskeletal: Positive for back pain.   Skin: Negative for rash.       Objective   Physical Exam   Constitutional: She is oriented to person, place, and time. She appears well-developed and well-nourished.   HENT:   Head: Normocephalic and atraumatic.   Right Ear: External ear normal.   Left Ear: External ear normal.   Mouth/Throat: Oropharynx is clear and moist.   Eyes: Pupils are equal, round, and reactive to light. Conjunctivae and EOM are normal.   Neck: Neck supple.   Cardiovascular: Normal rate and regular rhythm.   Pulmonary/Chest: Effort normal and breath sounds normal. No respiratory distress.   Abdominal: Bowel sounds are normal.   Musculoskeletal: Normal range of motion. She exhibits tenderness.   She has pain and tenderness to her bilateral knees most of the time.  And her back.   Lymphadenopathy:     She has no cervical adenopathy.   Neurological: She is alert and oriented to person, place, and time.   Skin: Skin is warm and dry.   Psychiatric: She has a normal mood and affect.   Nursing note and vitals reviewed.        Assessment/Plan   Problem  List Items Addressed This Visit        Cardiovascular and Mediastinum    BP (high blood pressure) - Primary    Relevant Orders    Comprehensive Metabolic Panel (Completed)       Endocrine    Diabetes mellitus type 2, noninsulin dependent (CMS/[facility])    Relevant Orders    Hemoglobin A1c (Completed)       Nervous and Auditory    Neuropathy      Other Visit Diagnoses     Mixed hyperlipidemia        Relevant Orders    Lipid Panel With LDL / HDL Ratio (Completed)    Chronic pain of both knees        OAB (overactive bladder)        Relevant Orders    Ambulatory Referral to Urology    Class 3 severe obesity due to excess calories with serious comorbidity and body mass index (BMI) of 45.0 to 49.9 in adult (CMS/[facility])                   Return in about 3 months (around 6/9/2020).

## 2020-03-10 DIAGNOSIS — I10 ESSENTIAL HYPERTENSION: ICD-10-CM

## 2020-03-10 DIAGNOSIS — E11.9 DIABETES MELLITUS TYPE 2, NONINSULIN DEPENDENT (HCC): ICD-10-CM

## 2020-03-10 LAB
ALBUMIN SERPL-MCNC: 4.1 G/DL (ref 3.8–4.8)
ALBUMIN/GLOB SERPL: 1.2 {RATIO} (ref 1.2–2.2)
ALP SERPL-CCNC: 94 IU/L (ref 39–117)
ALT SERPL-CCNC: 9 IU/L (ref 0–32)
AST SERPL-CCNC: 12 IU/L (ref 0–40)
BILIRUB SERPL-MCNC: <0.2 MG/DL (ref 0–1.2)
BUN SERPL-MCNC: 13 MG/DL (ref 8–27)
BUN/CREAT SERPL: 15 (ref 12–28)
CALCIUM SERPL-MCNC: 10.7 MG/DL (ref 8.7–10.3)
CHLORIDE SERPL-SCNC: 103 MMOL/L (ref 96–106)
CHOLEST SERPL-MCNC: 207 MG/DL (ref 100–199)
CO2 SERPL-SCNC: 26 MMOL/L (ref 20–29)
CREAT SERPL-MCNC: 0.84 MG/DL (ref 0.57–1)
GLOBULIN SER CALC-MCNC: 3.3 G/DL (ref 1.5–4.5)
GLUCOSE SERPL-MCNC: 127 MG/DL (ref 65–99)
HBA1C MFR BLD: 6.7 % (ref 4.8–5.6)
HDLC SERPL-MCNC: 63 MG/DL
LDLC SERPL CALC-MCNC: 114 MG/DL (ref 0–99)
LDLC/HDLC SERPL: 1.8 RATIO (ref 0–3.2)
POTASSIUM SERPL-SCNC: 4.7 MMOL/L (ref 3.5–5.2)
PROT SERPL-MCNC: 7.4 G/DL (ref 6–8.5)
SODIUM SERPL-SCNC: 142 MMOL/L (ref 134–144)
TRIGL SERPL-MCNC: 150 MG/DL (ref 0–149)
VLDLC SERPL CALC-MCNC: 30 MG/DL (ref 5–40)

## 2020-03-10 RX ORDER — LISINOPRIL 40 MG/1
40 TABLET ORAL DAILY
Qty: 90 TABLET | Refills: 1 | Status: SHIPPED | OUTPATIENT
Start: 2020-03-10 | End: 2020-03-11 | Stop reason: SDUPTHER

## 2020-03-10 RX ORDER — PIOGLITAZONEHYDROCHLORIDE 45 MG/1
45 TABLET ORAL DAILY
Qty: 90 TABLET | Refills: 1 | Status: SHIPPED | OUTPATIENT
Start: 2020-03-10 | End: 2020-09-08

## 2020-03-10 RX ORDER — AMLODIPINE BESYLATE 10 MG/1
5 TABLET ORAL DAILY
Qty: 45 TABLET | Refills: 1 | Status: SHIPPED | OUTPATIENT
Start: 2020-03-10 | End: 2020-09-08

## 2020-03-10 RX ORDER — GLIPIZIDE 10 MG/1
10 TABLET, FILM COATED, EXTENDED RELEASE ORAL DAILY
Qty: 90 TABLET | Refills: 1 | Status: SHIPPED | OUTPATIENT
Start: 2020-03-10 | End: 2020-09-08

## 2020-03-11 DIAGNOSIS — I10 ESSENTIAL HYPERTENSION: ICD-10-CM

## 2020-03-11 RX ORDER — LISINOPRIL 40 MG/1
40 TABLET ORAL DAILY
Qty: 90 TABLET | Refills: 3 | Status: SHIPPED | OUTPATIENT
Start: 2020-03-11 | End: 2021-02-08

## 2020-09-06 DIAGNOSIS — E11.9 DIABETES MELLITUS TYPE 2, NONINSULIN DEPENDENT (HCC): ICD-10-CM

## 2020-09-06 DIAGNOSIS — I10 ESSENTIAL HYPERTENSION: ICD-10-CM

## 2020-09-08 RX ORDER — GLIPIZIDE 10 MG/1
TABLET, FILM COATED, EXTENDED RELEASE ORAL
Qty: 90 TABLET | Refills: 3 | Status: SHIPPED | OUTPATIENT
Start: 2020-09-08 | End: 2021-09-01

## 2020-09-08 RX ORDER — AMLODIPINE BESYLATE 10 MG/1
TABLET ORAL
Qty: 45 TABLET | Refills: 3 | Status: SHIPPED | OUTPATIENT
Start: 2020-09-08 | End: 2021-03-10 | Stop reason: DRUGHIGH

## 2020-09-08 RX ORDER — PIOGLITAZONEHYDROCHLORIDE 45 MG/1
TABLET ORAL
Qty: 90 TABLET | Refills: 3 | Status: SHIPPED | OUTPATIENT
Start: 2020-09-08 | End: 2021-09-01

## 2021-02-08 ENCOUNTER — OFFICE VISIT (OUTPATIENT)
Dept: FAMILY MEDICINE CLINIC | Facility: CLINIC | Age: 71
End: 2021-02-08

## 2021-02-08 VITALS
OXYGEN SATURATION: 99 % | DIASTOLIC BLOOD PRESSURE: 72 MMHG | BODY MASS INDEX: 43.4 KG/M2 | HEART RATE: 101 BPM | HEIGHT: 69 IN | WEIGHT: 293 LBS | RESPIRATION RATE: 18 BRPM | SYSTOLIC BLOOD PRESSURE: 168 MMHG

## 2021-02-08 DIAGNOSIS — I83.013: ICD-10-CM

## 2021-02-08 DIAGNOSIS — L60.2 HYPERTROPHIC TOENAIL: Primary | ICD-10-CM

## 2021-02-08 DIAGNOSIS — I87.2 VENOUS STASIS DERMATITIS OF BOTH LOWER EXTREMITIES: ICD-10-CM

## 2021-02-08 DIAGNOSIS — R60.1 GENERALIZED EDEMA: ICD-10-CM

## 2021-02-08 PROCEDURE — 99213 OFFICE O/P EST LOW 20 MIN: CPT | Performed by: NURSE PRACTITIONER

## 2021-02-08 NOTE — PROGRESS NOTES
Subjective hip and knee pain  Terra Peoples is a 70 y.o. female.   Leg Pain and brusing on legs    History of Present Illness   Hip and knee pain that she has had for a while. At one point she was receiving injections for pain but her insurance would not let her get them anymore.  She also has pain to bilateral lower legs with some edema and pain and swelling to feet.  She currently weighs 320 lbs and is not able to be very active because of her pain. She has a lift chair at home.    The following portions of the patient's history were reviewed and updated as appropriate: allergies, current medications, past family history, past medical history, past social history, past surgical history and problem list.    Review of Systems   Constitutional: Negative for activity change, appetite change, chills and fever.   HENT: Negative for congestion.    Eyes: Negative for pain.   Respiratory: Negative for cough and shortness of breath.    Cardiovascular: Positive for leg swelling. Negative for chest pain.   Gastrointestinal: Negative for nausea and vomiting.   Genitourinary: Negative for difficulty urinating.   Skin: Negative for rash.   Neurological: Negative for dizziness, facial asymmetry and headache.       Objective   Physical Exam  Vitals signs and nursing note reviewed.   Constitutional:       General: She is not in acute distress.     Appearance: She is well-developed.   HENT:      Head: Normocephalic and atraumatic.      Right Ear: External ear normal.      Left Ear: External ear normal.   Neck:      Musculoskeletal: Neck supple.      Thyroid: No thyromegaly.   Cardiovascular:      Rate and Rhythm: Normal rate and regular rhythm.      Heart sounds: Normal heart sounds.   Pulmonary:      Effort: Pulmonary effort is normal.      Breath sounds: Normal breath sounds.   Musculoskeletal: Normal range of motion.      Right lower leg: Edema present.      Left lower leg: Edema present.      Comments: Has venous stasis and  pitting edema.  Her toes are cold and she has pain to her legs. She does not move much at home.  + pedal pulses   Skin:     General: Skin is warm.   Neurological:      Mental Status: She is alert.           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

## 2021-02-09 NOTE — PATIENT INSTRUCTIONS
Chronic Venous Insufficiency  Chronic venous insufficiency is a condition where the leg veins cannot effectively pump blood from the legs to the heart. This happens when the vein walls are either stretched, weakened, or damaged, or when the valves inside the vein are damaged. With the right treatment, you should be able to continue with an active life. This condition is also called venous stasis.  What are the causes?  Common causes of this condition include:  · High blood pressure inside the veins (venous hypertension).  · Sitting or standing too long, causing increased blood pressure in the leg veins.  · A blood clot that blocks blood flow in a vein (deep vein thrombosis, DVT).  · Inflammation of a vein (phlebitis) that causes a blood clot to form.  · Tumors in the pelvis that cause blood to back up.  What increases the risk?  The following factors may make you more likely to develop this condition:  · Having a family history of this condition.  · Obesity.  · Pregnancy.  · Living without enough regular physical activity or exercise (sedentary lifestyle).  · Smoking.  · Having a job that requires long periods of standing or sitting in one place.  · Being a certain age. Women in their 40s and 50s and men in their 70s are more likely to develop this condition.  What are the signs or symptoms?  Symptoms of this condition include:  · Veins that are enlarged, bulging, or twisted (varicose veins).  · Skin breakdown or ulcers.  · Reddened skin or dark discoloration of skin on the leg between the knee and ankle.  · Brown, smooth, tight, and painful skin just above the ankle, usually on the inside of the leg (lipodermatosclerosis).  · Swelling of the legs.  How is this diagnosed?  This condition may be diagnosed based on:  · Your medical history.  · A physical exam.  · Tests, such as:  ? A procedure that creates an image of a blood vessel and nearby organs and provides information about blood flow through the blood vessel  (duplex ultrasound).  ? A procedure that tests blood flow (plethysmography).  ? A procedure that looks at the veins using X-ray and dye (venogram).  How is this treated?  The goals of treatment are to help you return to an active life and to minimize pain or disability. Treatment depends on the severity of your condition, and it may include:  · Wearing compression stockings. These can help relieve symptoms and help prevent your condition from getting worse. However, they do not cure the condition.  · Sclerotherapy. This procedure involves an injection of a solution that shrinks damaged veins.  · Surgery. This may involve:  ? Removing a diseased vein (vein stripping).  ? Cutting off blood flow through the vein (laser ablation surgery).  ? Repairing or reconstructing a valve within the affected vein.  Follow these instructions at home:         · Wear compression stockings as told by your health care provider. These stockings help to prevent blood clots and reduce swelling in your legs.  · Take over-the-counter and prescription medicines only as told by your health care provider.  · Stay active by exercising, walking, or doing different activities. Ask your health care provider what activities are safe for you and how much exercise you need.  · Drink enough fluid to keep your urine pale yellow.  · Do not use any products that contain nicotine or tobacco, such as cigarettes, e-cigarettes, and chewing tobacco. If you need help quitting, ask your health care provider.  · Keep all follow-up visits as told by your health care provider. This is important.  Contact a health care provider if you:  · Have redness, swelling, or more pain in the affected area.  · See a red streak or line that goes up or down from the affected area.  · Have skin breakdown or skin loss in the affected area, even if the breakdown is small.  · Get an injury in the affected area.  Get help right away if:  · You get an injury and an open wound in the  affected area.  · You have:  ? Severe pain that does not get better with medicine.  ? Sudden numbness or weakness in the foot or ankle below the affected area.  ? Trouble moving your foot or ankle.  ? A fever.  ? Worse or persistent symptoms.  ? Chest pain.  ? Shortness of breath.  Summary  · Chronic venous insufficiency is a condition where the leg veins cannot effectively pump blood from the legs to the heart.  · Chronic venous insufficiency occurs when the vein walls become stretched, weakened, or damaged, or when valves within the vein are damaged.  · Treatment depends on how severe your condition is. It often involves wearing compression stockings and may involve having a procedure.  · Make sure you stay active by exercising, walking, or doing different activities. Ask your health care provider what activities are safe for you and how much exercise you need.  This information is not intended to replace advice given to you by your health care provider. Make sure you discuss any questions you have with your health care provider.  Document Revised: 09/10/2019 Document Reviewed: 09/10/2019  Elsevier Patient Education © 2020 [employer]

## 2021-02-22 DIAGNOSIS — M79.604 BILATERAL LEG PAIN: Primary | ICD-10-CM

## 2021-02-22 DIAGNOSIS — M79.605 BILATERAL LEG PAIN: Primary | ICD-10-CM

## 2021-03-01 ENCOUNTER — HOSPITAL ENCOUNTER (OUTPATIENT)
Dept: CARDIOLOGY | Facility: HOSPITAL | Age: 71
Discharge: HOME | End: 2021-03-01
Admitting: NURSE PRACTITIONER

## 2021-03-01 LAB

## 2021-03-01 PROCEDURE — 93970 EXTREMITY STUDY: CPT

## 2021-03-10 ENCOUNTER — TELEPHONE (OUTPATIENT)
Dept: FAMILY MEDICINE CLINIC | Facility: CLINIC | Age: 71
End: 2021-03-10

## 2021-03-10 DIAGNOSIS — I10 ESSENTIAL HYPERTENSION: ICD-10-CM

## 2021-03-10 RX ORDER — AMLODIPINE BESYLATE 10 MG/1
10 TABLET ORAL DAILY
Qty: 90 TABLET | Refills: 1 | Status: SHIPPED | OUTPATIENT
Start: 2021-03-10 | End: 2021-09-28

## 2021-03-10 NOTE — TELEPHONE ENCOUNTER
Caller: Terra Peoples    Relationship to patient: Self    Best call back number: [phone]    Patient is needing: PATIENT WOULD LIKE TO KNOW THE STATUS OF THE TEST THAT WAS RUN ON HER LEG ON 03/01     PLEASE CALL AND ADVISE.

## 2021-09-01 DIAGNOSIS — E11.9 DIABETES MELLITUS TYPE 2, NONINSULIN DEPENDENT (HCC): ICD-10-CM

## 2021-09-01 RX ORDER — GLIPIZIDE 10 MG/1
TABLET, FILM COATED, EXTENDED RELEASE ORAL
Qty: 90 TABLET | Refills: 3 | Status: SHIPPED | OUTPATIENT
Start: 2021-09-01 | End: 2023-02-23 | Stop reason: SDUPTHER

## 2021-09-01 RX ORDER — PIOGLITAZONEHYDROCHLORIDE 45 MG/1
TABLET ORAL
Qty: 90 TABLET | Refills: 3 | Status: SHIPPED | OUTPATIENT
Start: 2021-09-01 | End: 2023-02-23 | Stop reason: SDUPTHER

## 2021-09-28 RX ORDER — AMLODIPINE BESYLATE 10 MG/1
TABLET ORAL
Qty: 30 TABLET | Refills: 0 | Status: SHIPPED | OUTPATIENT
Start: 2021-09-28 | End: 2021-12-20

## 2021-10-04 ENCOUNTER — OFFICE VISIT (OUTPATIENT)
Dept: FAMILY MEDICINE CLINIC | Facility: CLINIC | Age: 71
End: 2021-10-04

## 2021-10-04 VITALS
HEIGHT: 69 IN | HEART RATE: 90 BPM | BODY MASS INDEX: 43.4 KG/M2 | RESPIRATION RATE: 16 BRPM | SYSTOLIC BLOOD PRESSURE: 160 MMHG | OXYGEN SATURATION: 98 % | DIASTOLIC BLOOD PRESSURE: 65 MMHG | WEIGHT: 293 LBS

## 2021-10-04 DIAGNOSIS — M25.562 CHRONIC PAIN OF BOTH KNEES: ICD-10-CM

## 2021-10-04 DIAGNOSIS — Z00.00 MEDICARE ANNUAL WELLNESS VISIT, SUBSEQUENT: Primary | ICD-10-CM

## 2021-10-04 DIAGNOSIS — Z13.6 ENCOUNTER FOR LIPID SCREENING FOR CARDIOVASCULAR DISEASE: ICD-10-CM

## 2021-10-04 DIAGNOSIS — I10 PRIMARY HYPERTENSION: ICD-10-CM

## 2021-10-04 DIAGNOSIS — M25.561 CHRONIC PAIN OF BOTH KNEES: ICD-10-CM

## 2021-10-04 DIAGNOSIS — E11.40 TYPE 2 DIABETES MELLITUS WITH DIABETIC NEUROPATHY, WITHOUT LONG-TERM CURRENT USE OF INSULIN (HCC): ICD-10-CM

## 2021-10-04 DIAGNOSIS — Z13.220 ENCOUNTER FOR LIPID SCREENING FOR CARDIOVASCULAR DISEASE: ICD-10-CM

## 2021-10-04 DIAGNOSIS — G89.29 CHRONIC PAIN OF BOTH KNEES: ICD-10-CM

## 2021-10-04 PROCEDURE — G0439 PPPS, SUBSEQ VISIT: HCPCS | Performed by: NURSE PRACTITIONER

## 2021-10-04 RX ORDER — GABAPENTIN 100 MG/1
100 CAPSULE ORAL 3 TIMES DAILY
COMMUNITY
Start: 2021-07-29 | End: 2021-10-04

## 2021-10-04 NOTE — PATIENT INSTRUCTIONS
Preventive Care 65 Years and Older, Female  Preventive care refers to lifestyle choices and visits with your health care provider that can promote health and wellness. This includes:  · A yearly physical exam. This is also called an annual well check.  · Regular dental and eye exams.  · Immunizations.  · Screening for certain conditions.  · Healthy lifestyle choices, such as diet and exercise.  What can I expect for my preventive care visit?  Physical exam  Your health care provider will check:  · Height and weight. These may be used to calculate body mass index (BMI), which is a measurement that tells if you are at a healthy weight.  · Heart rate and blood pressure.  · Your skin for abnormal spots.  Counseling  Your health care provider may ask you questions about:  · Alcohol, tobacco, and drug use.  · Emotional well-being.  · Home and relationship well-being.  · Sexual activity.  · Eating habits.  · History of falls.  · Memory and ability to understand (cognition).  · Work and work environment.  · Pregnancy and menstrual history.  What immunizations do I need?    Influenza (flu) vaccine  · This is recommended every year.  Tetanus, diphtheria, and pertussis (Tdap) vaccine  · You may need a Td booster every 10 years.  Varicella (chickenpox) vaccine  · You may need this vaccine if you have not already been vaccinated.  Zoster (shingles) vaccine  · You may need this after age 60.  Pneumococcal conjugate (PCV13) vaccine  · One dose is recommended after age 65.  Pneumococcal polysaccharide (PPSV23) vaccine  · One dose is recommended after age 65.  Measles, mumps, and rubella (MMR) vaccine  · You may need at least one dose of MMR if you were born in 1957 or later. You may also need a second dose.  Meningococcal conjugate (MenACWY) vaccine  · You may need this if you have certain conditions.  Hepatitis A vaccine  · You may need this if you have certain conditions or if you travel or work in places where you may be  exposed to hepatitis A.  Hepatitis B vaccine  · You may need this if you have certain conditions or if you travel or work in places where you may be exposed to hepatitis B.  Haemophilus influenzae type b (Hib) vaccine  · You may need this if you have certain conditions.  You may receive vaccines as individual doses or as more than one vaccine together in one shot (combination vaccines). Talk with your health care provider about the risks and benefits of combination vaccines.  What tests do I need?  Blood tests  · Lipid and cholesterol levels. These may be checked every 5 years, or more frequently depending on your overall health.  · Hepatitis C test.  · Hepatitis B test.  Screening  · Lung cancer screening. You may have this screening every year starting at age 55 if you have a 30-pack-year history of smoking and currently smoke or have quit within the past 15 years.  · Colorectal cancer screening. All adults should have this screening starting at age 50 and continuing until age 75. Your health care provider may recommend screening at age 45 if you are at increased risk. You will have tests every 1-10 years, depending on your results and the type of screening test.  · Diabetes screening. This is done by checking your blood sugar (glucose) after you have not eaten for a while (fasting). You may have this done every 1-3 years.  · Mammogram. This may be done every 1-2 years. Talk with your health care provider about how often you should have regular mammograms.  · BRCA-related cancer screening. This may be done if you have a family history of breast, ovarian, tubal, or peritoneal cancers.  Other tests  · Sexually transmitted disease (STD) testing.  · Bone density scan. This is done to screen for osteoporosis. You may have this done starting at age 65.  Follow these instructions at home:  Eating and drinking  · Eat a diet that includes fresh fruits and vegetables, whole grains, lean protein, and low-fat dairy products.  Limit your intake of foods with high amounts of sugar, saturated fats, and salt.  · Take vitamin and mineral supplements as recommended by your health care provider.  · Do not drink alcohol if your health care provider tells you not to drink.  · If you drink alcohol:  ? Limit how much you have to 0-1 drink a day.  ? Be aware of how much alcohol is in your drink. In the [place] one drink equals one 12 oz bottle of beer (355 mL), one 5 oz glass of wine (148 mL), or one 1½ oz glass of hard liquor (44 mL).  Lifestyle  · Take daily care of your teeth and gums.  · Stay active. Exercise for at least 30 minutes on 5 or more days each week.  · Do not use any products that contain nicotine or tobacco, such as cigarettes, e-cigarettes, and chewing tobacco. If you need help quitting, ask your health care provider.  · If you are sexually active, practice safe sex. Use a condom or other form of protection in order to prevent STIs (sexually transmitted infections).  · Talk with your health care provider about taking a low-dose aspirin or statin.  What's next?  · Go to your health care provider once a year for a well check visit.  · Ask your health care provider how often you should have your eyes and teeth checked.  · Stay up to date on all vaccines.  This information is not intended to replace advice given to you by your health care provider. Make sure you discuss any questions you have with your health care provider.  Document Revised: 12/12/2019 Document Reviewed: 12/12/2019  Elsevier Patient Education © 2020 [employer]

## 2021-10-04 NOTE — PROGRESS NOTES
Medicare Subsequent Wellness Visit  Subjective   History of Present Illness    Terra Peoples is a 71 y.o. female who presents for an Medicare Wellness Visit. In addition, we addressed the following health issues:  Is not vaccinated against Covid and is wearing her mask when she goes somewhere,and we discussed that today.  She has an exercise bike at home but does not use it.    PMH, PSH, SocHx, FamHx, Allergies, and Medications: Reviewed and updated in the Visit Navigator.   Family History   Problem Relation Age of Onset   • No Known Problems Mother    • No Known Problems Father    • No Known Problems Maternal Grandmother    • No Known Problems Maternal Grandfather    • No Known Problems Paternal Grandmother    • No Known Problems Paternal Grandfather        Social History     Social History Narrative    She lives at home with one adult son.  Retired from .         Allergies   Allergen Reactions   • Lisinopril Angioedema       Outpatient Medications Prior to Visit   Medication Sig Dispense Refill   • amLODIPine (NORVASC) 10 MG tablet TAKE 1 TABLET DAILY 30 tablet 0   • Cholecalciferol (vitamin D3) 125 MCG (5000 UT) tablet tablet Take 5,000 Units by mouth Daily.     • Ferrous Sulfate 27 MG tablet Take 27 mg by mouth Daily With Breakfast.     • glipizide (GLUCOTROL XL) 10 MG 24 hr tablet TAKE 1 TABLET DAILY 90 tablet 3   • glucose blood test strip OneTouch Ultra Blue In Vitro Strip; Patient Sig: OneTouch Ultra Blue In Vitro Strip TO TEST FOUR TIMES DAILY AND AS NEEDED; 2; 6; 28-Oct-2014; Active     • ibuprofen (ADVIL,MOTRIN) 200 MG tablet Take 200 mg by mouth Every 6 (Six) Hours As Needed for Mild Pain .     • metFORMIN (GLUCOPHAGE) 1000 MG tablet TAKE 1 TABLET TWICE A  tablet 3   • pioglitazone (ACTOS) 45 MG tablet TAKE 1 TABLET DAILY 90 tablet 3   • gabapentin (NEURONTIN) 100 MG capsule Take 100 mg by mouth 3 (Three) Times a Day.     • acetaminophen (TYLENOL) 500 MG tablet Take 500 mg by mouth  Every 6 (Six) Hours As Needed for Mild Pain .     • gabapentin (NEURONTIN) 100 MG capsule      • Liniments (BLUE-EMU SUPER STRENGTH EX) Apply  topically As Needed.     • Pumpkin Seed-Soy Germ (AZO BLADDER CONTROL/GO-LESS PO) Take  by mouth Daily.       No facility-administered medications prior to visit.        Patient Active Problem List   Diagnosis   • Chronic anemia   • Degenerative arthritis of lumbar spine   • Hypertrophic polyarthritis   • Diffuse goiter   • BP (high blood pressure)   • Diabetes mellitus type 2, noninsulin dependent (HCC)   • Chronic depression   • Stress incontinence   • Neuropathy   • Venous insufficiency   • Venous stasis dermatitis   • Overactive bladder   • Multinodular goiter   • Colon cancer screening   • Normocytic anemia         Health Habits:  Dental Exam.No  Eye Exam. UTD just got some glasses  Exercise:No regular exercise  Current exercise activities include:see above    Recent Hospitalizations:  none    Age-appropriate Screening Schedule:  Refer to the list below for future screening recommendations based on patient's age. Orders for these recommended tests are listed in the plan section. The patient has been provided with a written plan.    Health Maintenance   Topic Date Due   • DXA SCAN  Never done   • Pneumococcal Vaccine 65+ (1 of 2 - PPSV23) Never done   • COVID-19 Vaccine (1) Never done   • TDAP/TD VACCINES (1 - Tdap) Never done   • ZOSTER VACCINE (1 of 2) Never done   • ANNUAL WELLNESS VISIT  Never done   • PAP SMEAR  Never done   • DIABETIC EYE EXAM  Never done   • MAMMOGRAM  09/11/2019   • URINE MICROALBUMIN  01/31/2020   • DIABETIC FOOT EXAM  04/15/2020   • HEMOGLOBIN A1C  09/09/2020   • LIPID PANEL  03/09/2021   • INFLUENZA VACCINE  10/01/2021   • COLORECTAL CANCER SCREENING  08/05/2024   • HEPATITIS C SCREENING  Completed       Depression Screen:   PHQ-2/PHQ-9 Depression Screening 10/4/2021   Little interest or pleasure in doing things 0   Feeling down, depressed, or  "hopeless 0   Total Score 0       Functional and Cognitive Screening:  Functional & Cognitive Status 10/4/2021   Do you have difficulty preparing food and eating? No   Do you have difficulty bathing yourself, getting dressed or grooming yourself? Yes   Do you have difficulty using the toilet? Yes   Do you have difficulty moving around from place to place? Yes   Do you have trouble with steps or getting out of a bed or a chair? Yes   Current Diet Well Balanced Diet   Dental Exam Up to date   Eye Exam Up to date   Exercise (times per week) 0 times per week   Current Exercises Include No Regular Exercise   Do you need help using the phone?  No   Are you deaf or do you have serious difficulty hearing?  No   Do you need help with transportation? Yes   Do you need help shopping? No   Do you need help preparing meals?  No   Do you need help with housework?  Yes   Do you need help with laundry? Yes   Do you need help taking your medications? No   Do you need help managing money? No   Do you ever drive or ride in a car without wearing a seat belt? No   Have you felt unusual stress, anger or loneliness in the last month? No   Who do you live with? Child   If you need help, do you have trouble finding someone available to you? No   Have you been bothered in the last four weeks by sexual problems? No   Do you have difficulty concentrating, remembering or making decisions? No       Does the patient have evidence of cognitive impairment? no        Review of Systems    Objective     Vitals:    10/04/21 1104   BP: 160/65   Pulse: 90   Resp: 16   SpO2: 98%   Weight: (!) 147 kg (324 lb)   Height: 175.3 cm (69\")       Body mass index is 47.85 kg/m².    PHYSICAL EXAM  Vitals reviewed and on chart.  HEENT: PERRLA, EOMI. Oral mucosa moist,   No LAD.  CV: RRR, no murmurs, rubs, clicks or gallops  LUNGS: CTA bilaterally  EXT: No edema, FROM in bilateral upper and lower ext  NEURO: CN II - XII grossly intact        ASSESSMENT AND " PLAN      Problems Addressed this Visit     None      Diagnoses    None.         Orders:  No orders of the defined types were placed in this encounter.      Follow Up:  No follow-ups on file.     An After Visit Summary and PPPS with all of these plans were given to the patient.      Preventative counseling for diet and exercise with patient at visit.  Pt reports that they wear their seatbelt regularly.

## 2021-10-05 LAB
ALBUMIN SERPL-MCNC: 4.3 G/DL (ref 3.5–5.2)
ALBUMIN/GLOB SERPL: 1.4 G/DL
ALP SERPL-CCNC: 98 U/L (ref 39–117)
ALT SERPL-CCNC: 13 U/L (ref 1–33)
AST SERPL-CCNC: 16 U/L (ref 1–32)
BILIRUB SERPL-MCNC: 0.2 MG/DL (ref 0–1.2)
BUN SERPL-MCNC: 16 MG/DL (ref 8–23)
BUN/CREAT SERPL: 20 (ref 7–25)
CALCIUM SERPL-MCNC: 10.2 MG/DL (ref 8.6–10.5)
CHLORIDE SERPL-SCNC: 105 MMOL/L (ref 98–107)
CHOLEST SERPL-MCNC: 219 MG/DL (ref 0–200)
CO2 SERPL-SCNC: 27 MMOL/L (ref 22–29)
CREAT SERPL-MCNC: 0.8 MG/DL (ref 0.57–1)
GLOBULIN SER CALC-MCNC: 3 GM/DL
GLUCOSE SERPL-MCNC: 163 MG/DL (ref 65–99)
HBA1C MFR BLD: 6.1 % (ref 4.8–5.6)
HDLC SERPL-MCNC: 65 MG/DL (ref 40–60)
LDLC SERPL CALC-MCNC: 140 MG/DL (ref 0–100)
LDLC/HDLC SERPL: 2.13 {RATIO}
POTASSIUM SERPL-SCNC: 4.3 MMOL/L (ref 3.5–5.2)
PROT SERPL-MCNC: 7.3 G/DL (ref 6–8.5)
SODIUM SERPL-SCNC: 143 MMOL/L (ref 136–145)
TRIGL SERPL-MCNC: 79 MG/DL (ref 0–150)
VLDLC SERPL CALC-MCNC: 14 MG/DL (ref 5–40)

## 2021-10-06 ENCOUNTER — TELEPHONE (OUTPATIENT)
Dept: FAMILY MEDICINE CLINIC | Facility: CLINIC | Age: 71
End: 2021-10-06

## 2021-10-06 NOTE — TELEPHONE ENCOUNTER
Caller: Terra Peoples    Relationship: Self    Best call back number: [phone]    What test was performed: BLOOD WORK     When was the test performed: 10/4/21    Where was the test performed: OFFICE    Additional notes: ALSO HAS QUESTION ON MEDICATION SAMUEL WAS GOING TO PRESCRIBE FOR HER BLADDER. NO MEDICATION WAS CALLED IN TO PHARMACY

## 2021-10-12 NOTE — TELEPHONE ENCOUNTER
PATIENT IS CALLING ABOUT THE URINOLOGIST SHE IS SUPPOSED TO GO SEE.    SHE IS NOT SURE WHO THE ONE SHE SAW BEFORE IS AND IS THINKING SHE MAY NEED TO GET REFERRAL TO A NEW ONE.      PLEASE ADVISE    CALLBACK NUMBER IS  [phone]

## 2021-10-12 NOTE — TELEPHONE ENCOUNTER
Pt informed that she seen dr. Castañeda last march and number given that she needs to call them and schedule an appt.

## 2021-12-20 RX ORDER — AMLODIPINE BESYLATE 10 MG/1
TABLET ORAL
Qty: 30 TABLET | Refills: 11 | Status: SHIPPED | OUTPATIENT
Start: 2021-12-20 | End: 2022-12-27 | Stop reason: SDUPTHER

## 2022-04-04 ENCOUNTER — OFFICE VISIT (OUTPATIENT)
Dept: FAMILY MEDICINE CLINIC | Facility: CLINIC | Age: 72
End: 2022-04-04

## 2022-04-04 VITALS
HEIGHT: 69 IN | BODY MASS INDEX: 43.4 KG/M2 | HEART RATE: 78 BPM | OXYGEN SATURATION: 98 % | DIASTOLIC BLOOD PRESSURE: 58 MMHG | SYSTOLIC BLOOD PRESSURE: 162 MMHG | WEIGHT: 293 LBS | RESPIRATION RATE: 16 BRPM

## 2022-04-04 DIAGNOSIS — R26.81 GAIT INSTABILITY: ICD-10-CM

## 2022-04-04 DIAGNOSIS — E11.40 TYPE 2 DIABETES MELLITUS WITH DIABETIC NEUROPATHY, WITHOUT LONG-TERM CURRENT USE OF INSULIN: ICD-10-CM

## 2022-04-04 DIAGNOSIS — Z01.89 ROUTINE LAB DRAW: Primary | ICD-10-CM

## 2022-04-04 DIAGNOSIS — Z13.6 ENCOUNTER FOR LIPID SCREENING FOR CARDIOVASCULAR DISEASE: ICD-10-CM

## 2022-04-04 DIAGNOSIS — Z13.220 ENCOUNTER FOR LIPID SCREENING FOR CARDIOVASCULAR DISEASE: ICD-10-CM

## 2022-04-04 PROCEDURE — 99213 OFFICE O/P EST LOW 20 MIN: CPT | Performed by: NURSE PRACTITIONER

## 2022-04-04 RX ORDER — MIRABEGRON 50 MG/1
50 TABLET, FILM COATED, EXTENDED RELEASE ORAL DAILY
COMMUNITY
Start: 2022-02-11

## 2022-04-04 NOTE — PROGRESS NOTES
Subjective   Terra Peoples is a 71 y.o. female.   Hypertension, Diabetes, and hearing issues    History of Present Illness   Hypertension and is stable on her 10 mg amlodipine.  Since her last visit on 10/4/2021 we had discussed getting up and moving more and today she has lost 19 pounds.  She reports her knees feel better.  She was drinking soda every day and stopped that and her caffeine.  She is doing well on her glipizide, Metformin and Actos.  She is followed by urology for her urge incontinence.    The following portions of the patient's history were reviewed and updated as appropriate: allergies, current medications, past family history, past medical history, past social history, past surgical history and problem list.    Review of Systems   Constitutional: Positive for activity change and appetite change. Negative for fever.   Respiratory: Negative for cough and shortness of breath.    Cardiovascular: Negative for chest pain and leg swelling.   Skin: Negative for rash.       Objective   Physical Exam  Vitals and nursing note reviewed.   Constitutional:       Appearance: She is well-developed.   HENT:      Head: Normocephalic and atraumatic.   Eyes:      Pupils: Pupils are equal, round, and reactive to light.   Pulmonary:      Effort: Pulmonary effort is normal.   Musculoskeletal:         General: Normal range of motion.   Skin:     General: Skin is warm and dry.   Neurological:      Mental Status: She is alert and oriented to person, place, and time.           Assessment/Plan   Problem List Items Addressed This Visit    None     Visit Diagnoses     Routine lab draw    -  Primary    Relevant Orders    Comprehensive Metabolic Panel    Encounter for lipid screening for cardiovascular disease        Relevant Orders    Lipid Panel With LDL / HDL Ratio    Type 2 diabetes mellitus with diabetic neuropathy, without long-term current use of insulin (HCC)        Relevant Orders    Hemoglobin A1c    Gait  instability        Relevant Orders    Ambulatory Referral to Physical Therapy Evaluate and treat (Completed)        I would like for her to start moving around more but am concerned about her gait. Discussed some home physical therapy and she agrees with the plan       Return in about 3 months (around 7/4/2022).

## 2022-04-05 LAB
ALBUMIN SERPL-MCNC: 4.4 G/DL (ref 3.8–4.8)
ALBUMIN/GLOB SERPL: 1.3 {RATIO} (ref 1.2–2.2)
ALP SERPL-CCNC: 107 IU/L (ref 44–121)
ALT SERPL-CCNC: 16 IU/L (ref 0–32)
AST SERPL-CCNC: 19 IU/L (ref 0–40)
BILIRUB SERPL-MCNC: <0.2 MG/DL (ref 0–1.2)
BUN SERPL-MCNC: 11 MG/DL (ref 8–27)
BUN/CREAT SERPL: 12 (ref 12–28)
CALCIUM SERPL-MCNC: 10.6 MG/DL (ref 8.7–10.3)
CHLORIDE SERPL-SCNC: 104 MMOL/L (ref 96–106)
CHOLEST SERPL-MCNC: 231 MG/DL (ref 100–199)
CO2 SERPL-SCNC: 23 MMOL/L (ref 20–29)
CREAT SERPL-MCNC: 0.92 MG/DL (ref 0.57–1)
EGFRCR SERPLBLD CKD-EPI 2021: 67 ML/MIN/1.73
GLOBULIN SER CALC-MCNC: 3.4 G/DL (ref 1.5–4.5)
GLUCOSE SERPL-MCNC: 118 MG/DL (ref 65–99)
HBA1C MFR BLD: 5.9 % (ref 4.8–5.6)
HDLC SERPL-MCNC: 65 MG/DL
LDLC SERPL CALC-MCNC: 154 MG/DL (ref 0–99)
LDLC/HDLC SERPL: 2.4 RATIO (ref 0–3.2)
POTASSIUM SERPL-SCNC: 4.6 MMOL/L (ref 3.5–5.2)
PROT SERPL-MCNC: 7.8 G/DL (ref 6–8.5)
SODIUM SERPL-SCNC: 143 MMOL/L (ref 134–144)
TRIGL SERPL-MCNC: 71 MG/DL (ref 0–149)
VLDLC SERPL CALC-MCNC: 12 MG/DL (ref 5–40)

## 2022-08-23 ENCOUNTER — OFFICE VISIT (OUTPATIENT)
Dept: FAMILY MEDICINE CLINIC | Facility: CLINIC | Age: 72
End: 2022-08-23

## 2022-08-23 VITALS
HEIGHT: 67 IN | SYSTOLIC BLOOD PRESSURE: 132 MMHG | HEART RATE: 93 BPM | DIASTOLIC BLOOD PRESSURE: 72 MMHG | OXYGEN SATURATION: 98 % | WEIGHT: 292 LBS | BODY MASS INDEX: 45.83 KG/M2

## 2022-08-23 DIAGNOSIS — H91.93 BILATERAL HEARING LOSS, UNSPECIFIED HEARING LOSS TYPE: ICD-10-CM

## 2022-08-23 DIAGNOSIS — I10 PRIMARY HYPERTENSION: ICD-10-CM

## 2022-08-23 DIAGNOSIS — H61.23 IMPACTED CERUMEN OF BOTH EARS: Primary | ICD-10-CM

## 2022-08-23 PROBLEM — N32.81 OVERACTIVE BLADDER: Status: ACTIVE | Noted: 2022-08-23

## 2022-08-23 PROBLEM — E78.00 HYPERCHOLESTEROLEMIA: Status: ACTIVE | Noted: 2022-08-23

## 2022-08-23 PROCEDURE — 99213 OFFICE O/P EST LOW 20 MIN: CPT | Performed by: NURSE PRACTITIONER

## 2022-08-23 NOTE — PROGRESS NOTES
Subjective   Terra Peoples is a 72 y.o. female.   Ear Fullness (Can't hear, feels up in head, feels full)    History of Present Illness   Established patient, new to this provider.     Patient presents with bilateral hearing loss and whirring in ears. She denies fever, pain or drainage from ears. She reports cleaning her ears out with bobbypin.      Chronic hypertension x years. Managed on amlodipine 10 mg qd. She has minimal ankle swelling. Denies chest pain, palps, headache or vision changes. Complicated by chronic obesity and DM2. BMI 45.    The following portions of the patient's history were reviewed and updated as appropriate: allergies, current medications, past family history, past medical history, past social history, past surgical history and problem list.    Review of Systems    Objective   Physical Exam  Vitals reviewed.   Constitutional:       Appearance: Normal appearance. She is obese.   HENT:      Head: Normocephalic.      Right Ear: There is impacted cerumen.      Left Ear: There is impacted cerumen.      Nose: Nose normal.      Mouth/Throat:      Mouth: Mucous membranes are moist.   Cardiovascular:      Rate and Rhythm: Normal rate and regular rhythm.      Pulses: Normal pulses.      Heart sounds: Normal heart sounds.   Pulmonary:      Effort: Pulmonary effort is normal.      Breath sounds: Normal breath sounds.   Musculoskeletal:      Cervical back: Normal range of motion.   Lymphadenopathy:      Cervical: No cervical adenopathy.   Skin:     General: Skin is warm.   Neurological:      General: No focal deficit present.      Mental Status: She is alert.   Psychiatric:         Mood and Affect: Mood normal.           Assessment & Plan   Problem List Items Addressed This Visit        Cardiac and Vasculature    BP (high blood pressure)      Other Visit Diagnoses     Impacted cerumen of both ears    -  Primary    Relevant Orders    Ambulatory Referral to Audiology    Bilateral hearing loss,  unspecified hearing loss type        Relevant Orders    Ambulatory Referral to Audiology        Hearing loss/cerumen impaction- ears irrigated, refer audiology  Do not use bobbypins or stick anything into ears    Htn- cw amlodpine, enc low na diet,walking and weight loss       Return in about 4 months (around 12/23/2022) for Medicare Wellness.

## 2022-11-15 NOTE — ANESTHESIA PREPROCEDURE EVALUATION
Anesthesia Evaluation     Patient summary reviewed and Nursing notes reviewed                Airway   Mallampati: III  Possible difficult intubation  Dental    (+) upper dentures    Pulmonary - negative pulmonary ROS   Cardiovascular     ECG reviewed  Rhythm: regular    (+) hypertension, valvular problems/murmurs TI, hyperlipidemia,       Neuro/Psych  (+) psychiatric history Depression,     GI/Hepatic/Renal/Endo    (+) morbid obesity,  diabetes mellitus type 2, hypothyroidism,     Musculoskeletal     Abdominal    Substance History - negative use     OB/GYN negative ob/gyn ROS         Other   (+) arthritis                   Anesthesia Plan    ASA 4     MAC   (BMI)  intravenous induction   Anesthetic plan, all risks, benefits, and alternatives have been provided, discussed and informed consent has been obtained with: patient.       Paramedian Forehead Flap Text: A decision was made to reconstruct the defect utilizing an interpolation axial flap and a staged reconstruction.  A telfa template was made of the defect.  This telfa template was then used to outline the paramedian forehead pedicle flap.  The donor area for the pedicle flap was then injected with anesthesia.  The flap was excised through the skin and subcutaneous tissue down to the layer of the underlying musculature.  The pedicle flap was carefully excised within this deep plane to maintain its blood supply.  The edges of the donor site were undermined.   The donor site was closed in a primary fashion.  The pedicle was then rotated into position and sutured.  Once the tube was sutured into place, adequate blood supply was confirmed with blanching and refill.  The pedicle was then wrapped with xeroform gauze and dressed appropriately with a telfa and gauze bandage to ensure continued blood supply and protect the attached pedicle.

## 2022-12-27 ENCOUNTER — OFFICE VISIT (OUTPATIENT)
Dept: FAMILY MEDICINE CLINIC | Facility: CLINIC | Age: 72
End: 2022-12-27

## 2022-12-27 VITALS
SYSTOLIC BLOOD PRESSURE: 140 MMHG | HEART RATE: 85 BPM | WEIGHT: 293 LBS | BODY MASS INDEX: 45.99 KG/M2 | DIASTOLIC BLOOD PRESSURE: 62 MMHG | HEIGHT: 67 IN | OXYGEN SATURATION: 97 %

## 2022-12-27 DIAGNOSIS — Z12.31 ENCOUNTER FOR SCREENING MAMMOGRAM FOR MALIGNANT NEOPLASM OF BREAST: ICD-10-CM

## 2022-12-27 DIAGNOSIS — M17.0 PRIMARY OSTEOARTHRITIS OF BOTH KNEES: ICD-10-CM

## 2022-12-27 DIAGNOSIS — D64.9 CHRONIC ANEMIA: ICD-10-CM

## 2022-12-27 DIAGNOSIS — M47.816 OSTEOARTHRITIS OF LUMBAR SPINE, UNSPECIFIED SPINAL OSTEOARTHRITIS COMPLICATION STATUS: ICD-10-CM

## 2022-12-27 DIAGNOSIS — E11.65 TYPE 2 DIABETES MELLITUS WITH HYPERGLYCEMIA, WITHOUT LONG-TERM CURRENT USE OF INSULIN: ICD-10-CM

## 2022-12-27 DIAGNOSIS — Z00.00 ENCOUNTER FOR SUBSEQUENT ANNUAL WELLNESS VISIT (AWV) IN MEDICARE PATIENT: Primary | ICD-10-CM

## 2022-12-27 DIAGNOSIS — I10 PRIMARY HYPERTENSION: ICD-10-CM

## 2022-12-27 DIAGNOSIS — I87.2 VENOUS STASIS DERMATITIS OF BOTH LOWER EXTREMITIES: ICD-10-CM

## 2022-12-27 PROCEDURE — 99213 OFFICE O/P EST LOW 20 MIN: CPT | Performed by: NURSE PRACTITIONER

## 2022-12-27 PROCEDURE — 1170F FXNL STATUS ASSESSED: CPT | Performed by: NURSE PRACTITIONER

## 2022-12-27 PROCEDURE — 1160F RVW MEDS BY RX/DR IN RCRD: CPT | Performed by: NURSE PRACTITIONER

## 2022-12-27 PROCEDURE — G0439 PPPS, SUBSEQ VISIT: HCPCS | Performed by: NURSE PRACTITIONER

## 2022-12-27 RX ORDER — MELOXICAM 7.5 MG/1
TABLET ORAL
Qty: 30 TABLET | Refills: 0 | Status: SHIPPED | OUTPATIENT
Start: 2022-12-27 | End: 2022-12-29 | Stop reason: SDUPTHER

## 2022-12-27 RX ORDER — AMLODIPINE BESYLATE 10 MG/1
10 TABLET ORAL DAILY
Qty: 90 TABLET | Refills: 3 | Status: SHIPPED | OUTPATIENT
Start: 2022-12-27 | End: 2023-03-27

## 2022-12-27 NOTE — PROGRESS NOTES
The ABCs of the Annual Wellness Visit  Subsequent Medicare Wellness Visit    Subjective    Terra Peoples is a 72 y.o. female who presents for a Subsequent Medicare Wellness Visit.    The following portions of the patient's history were reviewed and   updated as appropriate: allergies, current medications, past family history, past medical history, past social history, past surgical history and problem list.    Compared to one year ago, the patient feels her physical   health is better.    Compared to one year ago, the patient feels her mental   health is the same.    Recent Hospitalizations:  She was not admitted to the hospital during the last year.       Current Medical Providers:  Patient Care Team:  Wendy Hernández APRN as PCP - General (Family Medicine)    Outpatient Medications Prior to Visit   Medication Sig Dispense Refill   • Cholecalciferol (vitamin D3) 125 MCG (5000 UT) tablet tablet Take 5,000 Units by mouth Daily.     • Cyanocobalamin (VITAMIN B-12 PO) Take  by mouth.     • glipizide (GLUCOTROL XL) 10 MG 24 hr tablet TAKE 1 TABLET DAILY 90 tablet 3   • glucose blood test strip OneTouch Ultra Blue In Vitro Strip; Patient Sig: OneTouch Ultra Blue In Vitro Strip TO TEST FOUR TIMES DAILY AND AS NEEDED; 2; 6; 28-Oct-2014; Active     • metFORMIN (GLUCOPHAGE) 1000 MG tablet TAKE 1 TABLET TWICE A  tablet 3   • Multiple Vitamins-Minerals (ZINC PO) Take  by mouth.     • Myrbetriq 50 MG tablet sustained-release 24 hour 24 hr tablet Take 50 mg by mouth Daily.     • pioglitazone (ACTOS) 45 MG tablet TAKE 1 TABLET DAILY 90 tablet 3   • amLODIPine (NORVASC) 10 MG tablet TAKE 1 TABLET DAILY 30 tablet 11   • Ferrous Sulfate 27 MG tablet Take 27 mg by mouth Daily With Breakfast.     • ibuprofen (ADVIL,MOTRIN) 200 MG tablet Take 200 mg by mouth Every 6 (Six) Hours As Needed for Mild Pain .       No facility-administered medications prior to visit.       No opioid medication identified on active medication  "list. I have reviewed chart for other potential  high risk medication/s and harmful drug interactions in the elderly.          Aspirin is not on active medication list.  Aspirin use is not indicated based on review of current medical condition/s. Risk of harm outweighs potential benefits.  .    Patient Active Problem List   Diagnosis   • Chronic anemia   • Degenerative arthritis of lumbar spine   • Hypertrophic polyarthritis   • Diffuse goiter   • BP (high blood pressure)   • Diabetes mellitus type 2, noninsulin dependent (HCC)   • Chronic depression   • Stress incontinence   • Neuropathy   • Venous insufficiency   • Venous stasis dermatitis   • Overactive bladder   • Multinodular goiter   • Colon cancer screening   • Normocytic anemia   • Body mass index (BMI) of 45.0 to 49.9 in adult (HCC)   • Overactive bladder   • Hypercholesterolemia     Advance Care Planning  Advance Directive is not on file.  ACP discussion was held with the patient during this visit. Patient does not have an advance directive, information provided.     Objective    Vitals:    12/27/22 1548 12/27/22 1700   BP: 158/62 140/62   Pulse: 85    SpO2: 97%    Weight: (!) 143 kg (316 lb)    Height: 170.2 cm (67\")      Estimated body mass index is 49.49 kg/m² as calculated from the following:    Height as of this encounter: 170.2 cm (67\").    Weight as of this encounter: 143 kg (316 lb).    Class 3 Severe Obesity (BMI >=40). Obesity-related health conditions include the following: hypertension, diabetes mellitus, GERD, peripheral vascular disease and osteoarthritis. Obesity is unchanged. BMI is is above average; BMI management plan is completed. We discussed low calorie, low carb based diet program, portion control, increasing exercise, joining a fitness center or start home based exercise program and an bing-based approach such as Ryzing Pal or Lose It.      Does the patient have evidence of cognitive impairment? No          HEALTH RISK " ASSESSMENT    Smoking Status:  Social History     Tobacco Use   Smoking Status Never   Smokeless Tobacco Never     Alcohol Consumption:  Social History     Substance and Sexual Activity   Alcohol Use No     Fall Risk Screen:    KIMBERLEYADI Fall Risk Assessment was completed, and patient is at LOW risk for falls.Assessment completed on:12/27/2022    Depression Screening:  PHQ-2/PHQ-9 Depression Screening 12/27/2022   Little Interest or Pleasure in Doing Things 0-->not at all   Feeling Down, Depressed or Hopeless 0-->not at all   PHQ-9: Brief Depression Severity Measure Score 0       Health Habits and Functional and Cognitive Screening:  Functional & Cognitive Status 12/27/2022   Do you have difficulty preparing food and eating? No   Do you have difficulty bathing yourself, getting dressed or grooming yourself? No   Do you have difficulty using the toilet? No   Do you have difficulty moving around from place to place? Yes   Do you have trouble with steps or getting out of a bed or a chair? Yes   Current Diet Well Balanced Diet   Dental Exam Not up to date   Eye Exam Up to date   Exercise (times per week) -   Current Exercises Include No Regular Exercise   Do you need help using the phone?  No   Are you deaf or do you have serious difficulty hearing?  No   Do you need help with transportation? Yes   Do you need help shopping? No   Do you need help preparing meals?  No   Do you need help with housework?  No   Do you need help with laundry? Yes   Do you need help taking your medications? No   Do you need help managing money? No   Do you ever drive or ride in a car without wearing a seat belt? No   Have you felt unusual stress, anger or loneliness in the last month? Yes   Who do you live with? Child   If you need help, do you have trouble finding someone available to you? No   Have you been bothered in the last four weeks by sexual problems? No   Do you have difficulty concentrating, remembering or making decisions? No        Age-appropriate Screening Schedule:  Refer to the list below for future screening recommendations based on patient's age, sex and/or medical conditions. Orders for these recommended tests are listed in the plan section. The patient has been provided with a written plan.    Health Maintenance   Topic Date Due   • MAMMOGRAM  09/11/2019   • URINE MICROALBUMIN  01/31/2020   • HEMOGLOBIN A1C  10/04/2022   • DXA SCAN  12/27/2022 (Originally 1950)   • TDAP/TD VACCINES (1 - Tdap) 12/27/2022 (Originally 5/9/1969)   • ZOSTER VACCINE (1 of 2) 12/27/2023 (Originally 5/9/2001)   • INFLUENZA VACCINE  12/31/2023 (Originally 8/1/2022)   • LIPID PANEL  04/04/2023   • DIABETIC EYE EXAM  11/07/2023   • DIABETIC FOOT EXAM  12/27/2023   • PAP SMEAR  Discontinued                CMS Preventative Services Quick Reference  Risk Factors Identified During Encounter  Chronic Pain: Natural history and expected course discussed. Questions answered.  Chronic Pain Educational material Discussed and Shared in After Visit Summary for Patient.  OTC analgesics as needed. Proper dosing schedule discussed.   NSAIDs per medication orders.  Fall Risk-High or Moderate: Discussed Fall Prevention in the home and Information on Fall Prevention Shared in After Visit Summary  Inactivity/Sedentary: Patient was advised to exercise at least 150 minutes a week per CDC recommendations.  Polypharmacy: Medication List reviewed  Urinary Incontinence: Kegel exercises discussed  The above risks/problems have been discussed with the patient.      Follow Up:   Next Medicare Wellness visit to be scheduled in 1 year.       Additional E&M Note during same encounter follows:  Patient has multiple medical problems which are significant and separately identifiable that require additional work above and beyond the Medicare Wellness Visit.      Chief Complaint  Annual Exam (Knee and back pain)    Subjective        HPI  Terra Peoples is also being seen today for knee  "pain, OA and low back pain/spondylosis., She is using cane. She completed homehealth PT in May and reports with visitor in the home, she was more accountabel to doing exercises. No falls. She is wanting to tray another NSAID, declines injections. Will try more tylenol regularly. Knowns she would benefit from weight loss    She is concerned about dark skin around ankles and feet. She has had duplex ultrasounds and been to ER. Diagnosed with chronic venous stasis.  No ulcers or pain. She is wanting to know if skin color will remain dark.     Chronic htn x years. BPO initially elevated after transportation, walking from waiting room and pain. Now 140/62. On amlodipine 10, mild ankel edema. No chest pain, palps, heache or vision changes.     Chroninc anemia, she ran out of iron. Denies bleeding or bruising. No Pica or cravings.   Last hgb 11       Objective   Vital Signs:  /62   Pulse 85   Ht 170.2 cm (67\")   Wt (!) 143 kg (316 lb)   SpO2 97%   BMI 49.49 kg/m²     Physical Exam  Vitals reviewed.   Constitutional:       Appearance: She is obese.   HENT:      Nose: Nose normal.      Mouth/Throat:      Mouth: Mucous membranes are moist.   Eyes:      Pupils: Pupils are equal, round, and reactive to light.   Cardiovascular:      Rate and Rhythm: Normal rate and regular rhythm.   Pulmonary:      Effort: Pulmonary effort is normal.      Breath sounds: Normal breath sounds.   Abdominal:      General: Bowel sounds are normal.      Palpations: Abdomen is soft.   Musculoskeletal:         General: Tenderness present. Normal range of motion.   Skin:     General: Skin is warm and dry.      Capillary Refill: Capillary refill takes less than 2 seconds.             Comments: Chronic textured, thickened, hyperpigmented skin to bilateral lower legs   Neurological:      General: No focal deficit present.      Mental Status: She is alert.                         Assessment and Plan   Diagnoses and all orders for this visit:    1. " Encounter for subsequent annual wellness visit (AWV) in Medicare patient (Primary)    2. Primary hypertension  -     Lipid Panel With / Chol / HDL Ratio; Future  -     Comprehensive Metabolic Panel; Future  -     CBC & Differential; Future    3. Osteoarthritis of lumbar spine, unspecified spinal osteoarthritis complication status    4. Venous stasis dermatitis of both lower extremities    5. Chronic anemia  -     CBC & Differential; Future    6. Primary osteoarthritis of both knees    7. Type 2 diabetes mellitus with hyperglycemia, without long-term current use of insulin (HCC)  -     Hemoglobin A1c; Future  -     Microalbumin / Creatinine Urine Ratio - Urine, Clean Catch; Future    8. Encounter for screening mammogram for malignant neoplasm of breast  -     Mammo Screening Digital Tomosynthesis Bilateral With CAD; Future    Other orders  -     Ferrous Sulfate 27 MG tablet; Take 1 tablet by mouth Daily With Breakfast.  Dispense: 30 tablet; Refill: 3  -     amLODIPine (NORVASC) 10 MG tablet; Take 1 tablet by mouth Daily for 90 days.  Dispense: 90 tablet; Refill: 3  -     meloxicam (Mobic) 7.5 MG tablet; Take as needed for pain, not daily (do not take w aleve, ibuprofen or advil)  Dispense: 30 tablet; Refill: 0      1. HTN_ cw meds, enc weight loss, heart healthy diet and low-sodium    2.  OA spondylolis- Rx low-dose meloxicam 7.5 as needed, do not take daily, will monitor renal function, take extra strength Tylenol 500 to 1000 mg up to 3 times a day, use muscle rubs, heat and ice, patient declines referral for injections or physical therapy at this time    3.  Venous stasis-this is chronic condition, skin will always look thickened and dark, pulses and foot exam within normal limits today, encourage patient to wear compression hose, work on weight loss, elevate feet.  Inspect feet daily and notify provider for any wounds or ulcerations    4.  DM2-encourage patient to stop drinking regular soda, follow ADA diet,  return for lab check, take meds as directed         Follow Up   Return in about 6 months (around 6/27/2023) for Recheck.  Patient was given instructions and counseling regarding her condition or for health maintenance advice. Please see specific information pulled into the AVS if appropriate.

## 2022-12-29 ENCOUNTER — TELEPHONE (OUTPATIENT)
Dept: FAMILY MEDICINE CLINIC | Facility: CLINIC | Age: 72
End: 2022-12-29

## 2022-12-29 RX ORDER — MELOXICAM 7.5 MG/1
TABLET ORAL
Qty: 30 TABLET | Refills: 0 | Status: SHIPPED | OUTPATIENT
Start: 2022-12-29 | End: 2023-02-23 | Stop reason: SDUPTHER

## 2022-12-29 NOTE — TELEPHONE ENCOUNTER
Caller: Terra Peoples    Relationship to patient: Self    Best call back number: [phone]    Patient is needing: PATIENT STATES THAT SHE WAS PRESCRIBED meloxicam (Mobic) 7.5 MG tablet, ON 12/27 AND IT WAS SENT OVER TO [facility] BUT THEY WERE NOT ABLE TO FILL IT DUE TO IT BEING AN OVER THE COUNTER MEDICATION. WAS WANTING TO KNOW IF IT COULD BE SENT TO ANOTHER PHARMACY     [id number] - [address] AT [address] [place] [address] - [phone] [facility] [phone]

## 2023-01-23 ENCOUNTER — HOSPITAL ENCOUNTER (OUTPATIENT)
Dept: MAMMOGRAPHY | Facility: HOSPITAL | Age: 73
Discharge: HOME | End: 2023-01-23
Admitting: NURSE PRACTITIONER
Payer: MEDICARE

## 2023-01-23 DIAGNOSIS — Z12.31 ENCOUNTER FOR SCREENING MAMMOGRAM FOR MALIGNANT NEOPLASM OF BREAST: ICD-10-CM

## 2023-01-23 PROCEDURE — 77063 BREAST TOMOSYNTHESIS BI: CPT

## 2023-01-23 PROCEDURE — 77067 SCR MAMMO BI INCL CAD: CPT

## 2023-01-25 ENCOUNTER — TELEPHONE (OUTPATIENT)
Dept: FAMILY MEDICINE CLINIC | Facility: CLINIC | Age: 73
End: 2023-01-25
Payer: MEDICARE

## 2023-01-25 NOTE — TELEPHONE ENCOUNTER
Confirmed with pt that if we receive anything from anybody about medical supplies that she doesn't want and we are to shred papers.    I've already received something form [facility] and shredded papers.

## 2023-02-23 DIAGNOSIS — E11.9 DIABETES MELLITUS TYPE 2, NONINSULIN DEPENDENT: ICD-10-CM

## 2023-02-23 RX ORDER — PIOGLITAZONEHYDROCHLORIDE 45 MG/1
45 TABLET ORAL DAILY
Qty: 90 TABLET | Refills: 3 | Status: SHIPPED | OUTPATIENT
Start: 2023-02-23

## 2023-02-23 RX ORDER — MELOXICAM 7.5 MG/1
TABLET ORAL
Qty: 90 TABLET | Refills: 0 | Status: SHIPPED | OUTPATIENT
Start: 2023-02-23 | End: 2023-03-10

## 2023-02-23 RX ORDER — GLIPIZIDE 10 MG/1
10 TABLET, FILM COATED, EXTENDED RELEASE ORAL DAILY
Qty: 90 TABLET | Refills: 3 | Status: SHIPPED | OUTPATIENT
Start: 2023-02-23

## 2023-03-10 RX ORDER — MELOXICAM 7.5 MG/1
TABLET ORAL
Qty: 90 TABLET | Refills: 3 | Status: SHIPPED | OUTPATIENT
Start: 2023-03-10

## 2023-08-01 RX ORDER — MELOXICAM 7.5 MG/1
TABLET ORAL
Qty: 180 TABLET | Refills: 1 | Status: SHIPPED | OUTPATIENT
Start: 2023-08-01

## 2023-12-14 DIAGNOSIS — E11.9 DIABETES MELLITUS TYPE 2, NONINSULIN DEPENDENT: ICD-10-CM

## 2023-12-14 RX ORDER — GLIPIZIDE 10 MG/1
10 TABLET, FILM COATED, EXTENDED RELEASE ORAL DAILY
Qty: 90 TABLET | Refills: 3 | OUTPATIENT
Start: 2023-12-14

## 2024-01-07 DIAGNOSIS — E11.9 DIABETES MELLITUS TYPE 2, NONINSULIN DEPENDENT: ICD-10-CM

## 2024-01-08 ENCOUNTER — OFFICE VISIT (OUTPATIENT)
Dept: FAMILY MEDICINE CLINIC | Facility: CLINIC | Age: 74
End: 2024-01-08
Payer: MEDICARE

## 2024-01-08 VITALS
RESPIRATION RATE: 18 BRPM | BODY MASS INDEX: 45.99 KG/M2 | OXYGEN SATURATION: 99 % | HEART RATE: 77 BPM | WEIGHT: 293 LBS | HEIGHT: 67 IN | DIASTOLIC BLOOD PRESSURE: 72 MMHG | SYSTOLIC BLOOD PRESSURE: 156 MMHG

## 2024-01-08 DIAGNOSIS — I10 PRIMARY HYPERTENSION: ICD-10-CM

## 2024-01-08 DIAGNOSIS — E11.9 DIABETES MELLITUS TYPE 2, NONINSULIN DEPENDENT: ICD-10-CM

## 2024-01-08 DIAGNOSIS — M17.0 PRIMARY OSTEOARTHRITIS OF BOTH KNEES: ICD-10-CM

## 2024-01-08 DIAGNOSIS — E83.52 SERUM CALCIUM ELEVATED: ICD-10-CM

## 2024-01-08 DIAGNOSIS — Z00.00 ENCOUNTER FOR SUBSEQUENT ANNUAL WELLNESS VISIT (AWV) IN MEDICARE PATIENT: Primary | ICD-10-CM

## 2024-01-08 RX ORDER — MELOXICAM 7.5 MG/1
TABLET ORAL
Qty: 180 TABLET | Refills: 3 | Status: SHIPPED | OUTPATIENT
Start: 2024-01-08

## 2024-01-08 RX ORDER — PIOGLITAZONEHYDROCHLORIDE 45 MG/1
45 TABLET ORAL DAILY
Qty: 90 TABLET | Refills: 3 | Status: SHIPPED | OUTPATIENT
Start: 2024-01-08

## 2024-01-08 RX ORDER — OLMESARTAN MEDOXOMIL 20 MG/1
20 TABLET ORAL DAILY
Qty: 30 TABLET | Refills: 0 | Status: SHIPPED | OUTPATIENT
Start: 2024-01-08 | End: 2024-02-07

## 2024-01-08 NOTE — PROGRESS NOTES
The ABCs of the Annual Wellness Visit  Subsequent Medicare Wellness Visit    Subjective    Terra Peoples is a 73 y.o. female who presents for a Subsequent Medicare Wellness Visit.    The following portions of the patient's history were reviewed and   updated as appropriate: allergies, current medications, past family history, past medical history, past social history, past surgical history, and problem list.    Compared to one year ago, the patient feels her physical   health is the same.    Compared to one year ago, the patient feels her mental   health is the same.    Recent Hospitalizations:  She was not admitted to the hospital during the last year.       Current Medical Providers:  Patient Care Team:  Wendy Hernández APRN as PCP - General (Family Medicine)    Outpatient Medications Prior to Visit   Medication Sig Dispense Refill   • amLODIPine (NORVASC) 10 MG tablet      • Cholecalciferol (vitamin D3) 125 MCG (5000 UT) tablet tablet Take 1 tablet by mouth Daily.     • Cyanocobalamin (VITAMIN B-12 PO) Take  by mouth.     • Ferrous Sulfate 27 MG tablet Take 1 tablet by mouth Daily With Breakfast. 30 tablet 3   • glipizide (GLUCOTROL XL) 10 MG 24 hr tablet Take 1 tablet by mouth Daily. 90 tablet 3   • glucose blood test strip OneTouch Ultra Blue In Vitro Strip; Patient Sig: OneTouch Ultra Blue In Vitro Strip TO TEST FOUR TIMES DAILY AND AS NEEDED; 2; 6; 28-Oct-2014; Active     • meloxicam (MOBIC) 7.5 MG tablet TAKE 1 TABLET BY MOUTH TWICE  DAILY AS NEEDED FOR PAIN ; USE  SPARINGLY. (DO NOT TAKE WITH  ALEVE, IBUPROFEN, OR ADVIL) 180 tablet 3   • metFORMIN (GLUCOPHAGE) 1000 MG tablet TAKE 1 TABLET BY MOUTH TWICE  DAILY 180 tablet 3   • Myrbetriq 50 MG tablet sustained-release 24 hour 24 hr tablet Take 50 mg by mouth Daily.     • pioglitazone (ACTOS) 45 MG tablet TAKE 1 TABLET BY MOUTH DAILY 90 tablet 3   • Multiple Vitamins-Minerals (ZINC PO) Take  by mouth. (Patient not taking: Reported on 1/8/2024)       No  "facility-administered medications prior to visit.       No opioid medication identified on active medication list. I have reviewed chart for other potential  high risk medication/s and harmful drug interactions in the elderly.        Aspirin is not on active medication list.  Aspirin use is not indicated based on review of current medical condition/s. Risk of harm outweighs potential benefits.  .    Patient Active Problem List   Diagnosis   • Chronic anemia   • Degenerative arthritis of lumbar spine   • Hypertrophic polyarthritis   • Diffuse goiter   • BP (high blood pressure)   • Diabetes mellitus type 2, noninsulin dependent   • Chronic depression   • Stress incontinence   • Neuropathy   • Venous insufficiency   • Venous stasis dermatitis   • Overactive bladder   • Multinodular goiter   • Colon cancer screening   • Normocytic anemia   • Body mass index (BMI) of 45.0 to 49.9 in adult   • Overactive bladder   • Hypercholesterolemia     Advance Care Planning   Advance Care Planning     Advance Directive is not on file.  ACP discussion was held with the patient during this visit. Patient does not have an advance directive, information provided.     Objective    Vitals:    01/08/24 1400   BP: 156/72   Pulse: 77   Resp: 18   SpO2: 99%   Weight: (!) 140 kg (309 lb)   Height: 170.2 cm (67\")     Estimated body mass index is 48.4 kg/m² as calculated from the following:    Height as of this encounter: 170.2 cm (67\").    Weight as of this encounter: 140 kg (309 lb).    Class 3 Severe Obesity (BMI >=40). Obesity-related health conditions include the following: hypertension, diabetes mellitus, and dyslipidemias. Obesity is unchanged. BMI is is above average; BMI management plan is completed. We discussed low calorie, low carb based diet program, portion control, increasing exercise, Weight Watchers or other Commercial based weight reduction program, and an bing-based approach such as [email or web address] Pal or Lose It.      Does the " patient have evidence of cognitive impairment? No    Lab Results   Component Value Date    CHLPL 224 (H) 2024    TRIG 68 2024    HDL 67 2024     (H) 2024    VLDL 12 2024    HGBA1C 6.0 (H) 2024        HEALTH RISK ASSESSMENT    Smoking Status:  Social History     Tobacco Use   Smoking Status Never   Smokeless Tobacco Never     Alcohol Consumption:  Social History     Substance and Sexual Activity   Alcohol Use No     Fall Risk Screen:    KIMBERLEYADI Fall Risk Assessment was completed, and patient is at LOW risk for falls.Assessment completed on:2024    Depression Screenin/8/2024     2:02 PM   PHQ-2/PHQ-9 Depression Screening   Little Interest or Pleasure in Doing Things 0-->not at all   Feeling Down, Depressed or Hopeless 0-->not at all   PHQ-9: Brief Depression Severity Measure Score 0       Health Habits and Functional and Cognitive Screenin/8/2024     2:01 PM   Functional & Cognitive Status   Do you have difficulty preparing food and eating? No   Do you have difficulty bathing yourself, getting dressed or grooming yourself? No   Do you have difficulty using the toilet? No   Do you have difficulty moving around from place to place? Yes   Do you have trouble with steps or getting out of a bed or a chair? Yes   Current Diet Well Balanced Diet   Dental Exam Up to date   Eye Exam Up to date   Exercise (times per week) 0 times per week   Current Exercises Include No Regular Exercise   Do you need help using the phone?  No   Are you deaf or do you have serious difficulty hearing?  No   Do you need help to go to places out of walking distance? Yes   Do you need help shopping? No   Do you need help preparing meals?  No   Do you need help with housework?  No   Do you need help with laundry? Yes   Do you need help taking your medications? No   Do you need help managing money? No   Do you ever drive or ride in a car without wearing a seat belt? No   Have you felt  unusual stress, anger or loneliness in the last month? No   Who do you live with? Child   If you need help, do you have trouble finding someone available to you? No   Have you been bothered in the last four weeks by sexual problems? No   Do you have difficulty concentrating, remembering or making decisions? No       Age-appropriate Screening Schedule:  Refer to the list below for future screening recommendations based on patient's age, sex and/or medical conditions. Orders for these recommended tests are listed in the plan section. The patient has been provided with a written plan.    Health Maintenance   Topic Date Due   • DXA SCAN  Never done   • Pneumococcal Vaccine 65+ (1 of 2 - PCV) Never done   • TDAP/TD VACCINES (1 - Tdap) Never done   • ZOSTER VACCINE (1 of 2) Never done   • INFLUENZA VACCINE  08/01/2023   • COVID-19 Vaccine (4 - 2023-24 season) 09/01/2023   • DIABETIC EYE EXAM  11/07/2023   • DIABETIC FOOT EXAM  12/27/2023   • URINE MICROALBUMIN  06/26/2024   • HEMOGLOBIN A1C  07/08/2024   • COLORECTAL CANCER SCREENING  08/05/2024   • ANNUAL WELLNESS VISIT  01/08/2025   • LIPID PANEL  01/08/2025   • BMI FOLLOWUP  01/08/2025   • MAMMOGRAM  01/23/2025   • HEPATITIS C SCREENING  Completed   • PAP SMEAR  Discontinued                  CMS Preventative Services Quick Reference  Risk Factors Identified During Encounter  Fall Risk-High or Moderate: Discussed Fall Prevention in the home  Dental Screening Recommended  Vision Screening Recommended  The above risks/problems have been discussed with the patient.  Pertinent information has been shared with the patient in the After Visit Summary.  An After Visit Summary and PPPS were made available to the patient.    Follow Up:   Next Medicare Wellness visit to be scheduled in 1 year.       Additional E&M Note during same encounter follows:  Patient has multiple medical problems which are significant and separately identifiable that require additional work above and  "beyond the Medicare Wellness Visit.      Chief Complaint  Annual Exam, Knee Pain, and Hip Pain    Subjective        HPI  Terra Peoples is also being seen today for bilat knee pain and right hip pain worsening.  Also has some R hip pain. She would like to consider steroid shot. She has had hyaluronic acid injections in 2019 and this  did not help. She is on meloxicam 7.5 mg qd.    History chronic Dm2 X years.  Patient is managed on glipizide 10, metformin 1000 twice daily, Actos 45 daily.  She denies history of hypoglycemia.  She feels that her blood sugar control over the holidays may not have been as good.    Chronic hypertension X years, managed on amlodipine 10 mg  /72. She has been eating more and treats since holidays .  Mild left ankle edema, she denies chest pain, palpitations, headache or vision changes.  Complications by obesity, BMI 48, complicated by sitting for prolonged amount of time.      Review of Systems   Constitutional: Negative.  Negative for activity change, fatigue and fever.   HENT: Negative.  Negative for congestion.    Respiratory:  Negative for cough, shortness of breath and wheezing.    Cardiovascular:  Negative for chest pain, palpitations and leg swelling.   Gastrointestinal:  Negative for constipation, diarrhea, nausea and vomiting.   Endocrine: Negative for polydipsia, polyphagia and polyuria.   Genitourinary:  Negative for dysuria and hematuria.   Musculoskeletal:  Positive for arthralgias. Negative for back pain.   Skin:  Negative for wound.   Allergic/Immunologic: Negative for environmental allergies.   Neurological:  Negative for seizures and syncope.   Hematological:  Does not bruise/bleed easily.   Psychiatric/Behavioral:  Negative for dysphoric mood and sleep disturbance.        Objective   Vital Signs:  /72   Pulse 77   Resp 18   Ht 170.2 cm (67\")   Wt (!) 140 kg (309 lb)   SpO2 99%   BMI 48.40 kg/m²     Physical Exam  Vitals reviewed.   Constitutional:  "      Appearance: Normal appearance. She is obese.   HENT:      Head: Normocephalic.      Right Ear: Tympanic membrane normal.      Left Ear: Tympanic membrane normal.      Nose: Nose normal.      Mouth/Throat:      Mouth: Mucous membranes are moist.   Eyes:      Pupils: Pupils are equal, round, and reactive to light.   Cardiovascular:      Rate and Rhythm: Normal rate and regular rhythm.      Pulses: Normal pulses.      Heart sounds: Normal heart sounds.   Pulmonary:      Effort: Pulmonary effort is normal.      Breath sounds: Normal breath sounds.   Abdominal:      General: Bowel sounds are normal.      Palpations: Abdomen is soft.   Musculoskeletal:         General: Normal range of motion.      Cervical back: Normal range of motion.   Skin:     General: Skin is warm.   Neurological:      General: No focal deficit present.      Mental Status: She is alert.   Psychiatric:         Mood and Affect: Mood normal.                         Assessment and Plan   Diagnoses and all orders for this visit:    1. Encounter for subsequent annual wellness visit (AWV) in Medicare patient (Primary)    2. Diabetes mellitus type 2, noninsulin dependent  -     Hemoglobin A1c  -     Microalbumin / Creatinine Urine Ratio - Urine, Clean Catch    3. Primary hypertension  -     CBC & Differential  -     Comprehensive Metabolic Panel  -     Lipid Panel With / Chol / HDL Ratio  -     TSH    4. Primary osteoarthritis of both knees  -     Ambulatory Referral to Orthopedic Surgery  -     Miscellaneous DME    5. Serum calcium elevated  -     Vitamin D 25 hydroxy  -     PTH, Intact & Calcium    Other orders  -     olmesartan (Benicar) 20 MG tablet; Take 1 tablet by mouth Daily for 30 days.  Dispense: 30 tablet; Refill: 0  -     Specimen Status Report      OA knees-refer to orthopedics, if blood sugar controlled would want to consider steroid injections, encourage weight loss, walking, reviewed fall risk, continue with meloxicam, topical rubs,  heat, ice, Tylenol as needed    Hypertension-continue with meds, add new Rx olmesartan 20 mg, keep blood pressure log, goal less than 130/80., encourage weight loss, walking, low-sodium diet  Return in 4 weeks for blood pressure check and med management    DM2-continue with ADA diet, continue with medications.  Patient does not think she can afford GLP-1 medications.  Check lab labs    History of elevated calcium, check vitamin D, continue with vitamin D3 5000 IU daily.             Follow Up   Return in about 4 weeks (around 2/5/2024) for Recheck.  Patient was given instructions and counseling regarding her condition or for health maintenance advice. Please see specific information pulled into the AVS if appropriate.

## 2024-01-09 LAB
25(OH)D3+25(OH)D2 SERPL-MCNC: 20.5 NG/ML (ref 30–100)
ALBUMIN SERPL-MCNC: 4.3 G/DL (ref 3.8–4.8)
ALBUMIN/GLOB SERPL: 1.1 {RATIO} (ref 1.2–2.2)
ALP SERPL-CCNC: 88 IU/L (ref 44–121)
ALT SERPL-CCNC: 6 IU/L (ref 0–32)
AST SERPL-CCNC: 10 IU/L (ref 0–40)
BASOPHILS # BLD AUTO: 0 X10E3/UL (ref 0–0.2)
BASOPHILS NFR BLD AUTO: 0 %
BILIRUB SERPL-MCNC: 0.2 MG/DL (ref 0–1.2)
BUN SERPL-MCNC: 14 MG/DL (ref 8–27)
BUN/CREAT SERPL: 14 (ref 12–28)
CALCIUM SERPL-MCNC: 10.6 MG/DL (ref 8.7–10.3)
CHLORIDE SERPL-SCNC: 104 MMOL/L (ref 96–106)
CHOLEST SERPL-MCNC: 224 MG/DL (ref 100–199)
CHOLEST/HDLC SERPL: 3.3 RATIO (ref 0–4.4)
CO2 SERPL-SCNC: 22 MMOL/L (ref 20–29)
CREAT SERPL-MCNC: 1.02 MG/DL (ref 0.57–1)
EGFRCR SERPLBLD CKD-EPI 2021: 58 ML/MIN/1.73
EOSINOPHIL # BLD AUTO: 0.1 X10E3/UL (ref 0–0.4)
EOSINOPHIL NFR BLD AUTO: 2 %
ERYTHROCYTE [DISTWIDTH] IN BLOOD BY AUTOMATED COUNT: 13.1 % (ref 11.7–15.4)
GLOBULIN SER CALC-MCNC: 3.9 G/DL (ref 1.5–4.5)
GLUCOSE SERPL-MCNC: 50 MG/DL (ref 70–99)
HBA1C MFR BLD: 6 % (ref 4.8–5.6)
HCT VFR BLD AUTO: 34.8 % (ref 34–46.6)
HDLC SERPL-MCNC: 67 MG/DL
HGB BLD-MCNC: 11.6 G/DL (ref 11.1–15.9)
IMM GRANULOCYTES # BLD AUTO: 0 X10E3/UL (ref 0–0.1)
IMM GRANULOCYTES NFR BLD AUTO: 0 %
INTACT PTH: NORMAL
LDLC SERPL CALC-MCNC: 145 MG/DL (ref 0–99)
LYMPHOCYTES # BLD AUTO: 2.2 X10E3/UL (ref 0.7–3.1)
LYMPHOCYTES NFR BLD AUTO: 32 %
MCH RBC QN AUTO: 28.7 PG (ref 26.6–33)
MCHC RBC AUTO-ENTMCNC: 33.3 G/DL (ref 31.5–35.7)
MCV RBC AUTO: 86 FL (ref 79–97)
MONOCYTES # BLD AUTO: 0.5 X10E3/UL (ref 0.1–0.9)
MONOCYTES NFR BLD AUTO: 7 %
NEUTROPHILS # BLD AUTO: 4.1 X10E3/UL (ref 1.4–7)
NEUTROPHILS NFR BLD AUTO: 59 %
PLATELET # BLD AUTO: 355 X10E3/UL (ref 150–450)
POTASSIUM SERPL-SCNC: 4.1 MMOL/L (ref 3.5–5.2)
PROT SERPL-MCNC: 8.2 G/DL (ref 6–8.5)
PTH-INTACT SERPL-MCNC: 34 PG/ML (ref 15–65)
RBC # BLD AUTO: 4.04 X10E6/UL (ref 3.77–5.28)
SODIUM SERPL-SCNC: 142 MMOL/L (ref 134–144)
SPECIMEN STATUS: NORMAL
TRIGL SERPL-MCNC: 68 MG/DL (ref 0–149)
TSH SERPL DL<=0.005 MIU/L-ACNC: 0.83 UIU/ML (ref 0.45–4.5)
VLDLC SERPL CALC-MCNC: 12 MG/DL (ref 5–40)
WBC # BLD AUTO: 6.8 X10E3/UL (ref 3.4–10.8)

## 2024-01-09 RX ORDER — ATORVASTATIN CALCIUM 10 MG/1
10 TABLET, FILM COATED ORAL DAILY
Qty: 90 TABLET | Refills: 0 | Status: SHIPPED | OUTPATIENT
Start: 2024-01-09

## 2024-01-29 ENCOUNTER — TELEPHONE (OUTPATIENT)
Dept: FAMILY MEDICINE CLINIC | Facility: CLINIC | Age: 74
End: 2024-01-29

## 2024-01-29 NOTE — TELEPHONE ENCOUNTER
Caller: Terra Peoples    Relationship: Self    Best call back number: [phone]     What orders are you requesting (i.e. lab or imaging): PEARL VASQUEZ WITH SEAT     In what timeframe would the patient need to come in: ASAP    Where will you receive your lab/imaging services: [facility]     Additional notes: PLEASE CALL AND ADVISED WHEN DONE

## 2024-01-30 RX ORDER — OLMESARTAN MEDOXOMIL 20 MG/1
20 TABLET ORAL DAILY
Qty: 30 TABLET | Refills: 3 | Status: SHIPPED | OUTPATIENT
Start: 2024-01-30

## 2024-02-12 ENCOUNTER — OFFICE VISIT (OUTPATIENT)
Dept: FAMILY MEDICINE CLINIC | Facility: CLINIC | Age: 74
End: 2024-02-12
Payer: MEDICARE

## 2024-02-12 VITALS
WEIGHT: 293 LBS | HEIGHT: 67 IN | DIASTOLIC BLOOD PRESSURE: 70 MMHG | RESPIRATION RATE: 16 BRPM | OXYGEN SATURATION: 97 % | HEART RATE: 75 BPM | BODY MASS INDEX: 45.99 KG/M2 | SYSTOLIC BLOOD PRESSURE: 132 MMHG

## 2024-02-12 DIAGNOSIS — I10 PRIMARY HYPERTENSION: Primary | ICD-10-CM

## 2024-02-12 DIAGNOSIS — E55.9 VITAMIN D DEFICIENCY: ICD-10-CM

## 2024-02-12 DIAGNOSIS — R26.81 GAIT INSTABILITY: ICD-10-CM

## 2024-02-12 DIAGNOSIS — E78.2 MIXED HYPERLIPIDEMIA: ICD-10-CM

## 2024-02-12 DIAGNOSIS — M47.816 OSTEOARTHRITIS OF LUMBAR SPINE, UNSPECIFIED SPINAL OSTEOARTHRITIS COMPLICATION STATUS: ICD-10-CM

## 2024-02-12 PROCEDURE — 3078F DIAST BP <80 MM HG: CPT | Performed by: NURSE PRACTITIONER

## 2024-02-12 PROCEDURE — 3075F SYST BP GE 130 - 139MM HG: CPT | Performed by: NURSE PRACTITIONER

## 2024-02-12 PROCEDURE — 1160F RVW MEDS BY RX/DR IN RCRD: CPT | Performed by: NURSE PRACTITIONER

## 2024-02-12 PROCEDURE — 99214 OFFICE O/P EST MOD 30 MIN: CPT | Performed by: NURSE PRACTITIONER

## 2024-02-12 PROCEDURE — 3044F HG A1C LEVEL LT 7.0%: CPT | Performed by: NURSE PRACTITIONER

## 2024-02-12 PROCEDURE — 1159F MED LIST DOCD IN RCRD: CPT | Performed by: NURSE PRACTITIONER

## 2024-02-12 RX ORDER — AMLODIPINE BESYLATE 10 MG/1
10 TABLET ORAL DAILY
Qty: 90 TABLET | Refills: 2 | Status: SHIPPED | OUTPATIENT
Start: 2024-02-12 | End: 2024-05-12

## 2024-02-12 RX ORDER — ATORVASTATIN CALCIUM 10 MG/1
10 TABLET, FILM COATED ORAL DAILY
Qty: 90 TABLET | Refills: 0 | Status: SHIPPED | OUTPATIENT
Start: 2024-02-12

## 2024-02-12 RX ORDER — ERGOCALCIFEROL 1.25 MG/1
50000 CAPSULE ORAL WEEKLY
Qty: 8 CAPSULE | Refills: 0 | Status: SHIPPED | OUTPATIENT
Start: 2024-02-12 | End: 2024-04-12

## 2024-02-12 RX ORDER — OLMESARTAN MEDOXOMIL 20 MG/1
20 TABLET ORAL DAILY
Qty: 90 TABLET | Refills: 2 | Status: SHIPPED | OUTPATIENT
Start: 2024-02-12 | End: 2024-05-12

## 2024-03-25 RX ORDER — ERGOCALCIFEROL 1.25 MG/1
50000 CAPSULE ORAL WEEKLY
Qty: 8 CAPSULE | Refills: 5 | Status: SHIPPED | OUTPATIENT
Start: 2024-03-25

## 2024-05-23 RX ORDER — ATORVASTATIN CALCIUM 10 MG/1
10 TABLET, FILM COATED ORAL DAILY
Qty: 90 TABLET | Refills: 3 | Status: SHIPPED | OUTPATIENT
Start: 2024-05-23

## 2024-09-24 RX ORDER — OLMESARTAN MEDOXOMIL 20 MG/1
20 TABLET ORAL DAILY
Qty: 90 TABLET | Refills: 3 | Status: SHIPPED | OUTPATIENT
Start: 2024-09-24

## 2024-09-26 DIAGNOSIS — E11.9 DIABETES MELLITUS TYPE 2, NONINSULIN DEPENDENT: ICD-10-CM

## 2024-09-26 RX ORDER — AMLODIPINE BESYLATE 10 MG/1
10 TABLET ORAL DAILY
Qty: 30 TABLET | Refills: 0 | Status: SHIPPED | OUTPATIENT
Start: 2024-09-26

## 2024-09-26 RX ORDER — MELOXICAM 7.5 MG/1
7.5 TABLET ORAL DAILY
Qty: 30 TABLET | Refills: 0 | Status: SHIPPED | OUTPATIENT
Start: 2024-09-26

## 2024-09-26 RX ORDER — AMLODIPINE BESYLATE 10 MG/1
10 TABLET ORAL DAILY
Qty: 90 TABLET | Refills: 3 | OUTPATIENT
Start: 2024-09-26

## 2024-09-26 RX ORDER — PIOGLITAZONEHYDROCHLORIDE 45 MG/1
45 TABLET ORAL DAILY
Qty: 30 TABLET | Refills: 0 | Status: SHIPPED | OUTPATIENT
Start: 2024-09-26

## 2024-10-18 RX ORDER — AMLODIPINE BESYLATE 10 MG/1
10 TABLET ORAL DAILY
Qty: 30 TABLET | Refills: 11 | OUTPATIENT
Start: 2024-10-18

## 2024-10-18 NOTE — TELEPHONE ENCOUNTER
Appointment needed for further refills. Last refill sent for 30 day supply with note stating appointment needed for further refills.

## 2024-11-20 DIAGNOSIS — E55.9 VITAMIN D DEFICIENCY: Primary | ICD-10-CM

## 2024-11-21 RX ORDER — ERGOCALCIFEROL 1.25 MG/1
50000 CAPSULE, LIQUID FILLED ORAL WEEKLY
Qty: 13 CAPSULE | Refills: 3 | Status: SHIPPED | OUTPATIENT
Start: 2024-11-21

## 2024-12-17 DIAGNOSIS — E11.9 DIABETES MELLITUS TYPE 2, NONINSULIN DEPENDENT: ICD-10-CM

## 2024-12-17 RX ORDER — MELOXICAM 7.5 MG/1
7.5 TABLET ORAL DAILY
Qty: 30 TABLET | Refills: 0 | Status: SHIPPED | OUTPATIENT
Start: 2024-12-17

## 2024-12-17 RX ORDER — PIOGLITAZONE 45 MG/1
45 TABLET ORAL DAILY
Qty: 30 TABLET | Refills: 0 | Status: SHIPPED | OUTPATIENT
Start: 2024-12-17

## 2024-12-17 NOTE — TELEPHONE ENCOUNTER
Last visit 2/12/24  No f/u scheduled     30 DAY SUPPLY GIVEN, NOTE TO PHARM PT NEEDS APPT FOR FURTHER REFILLS

## 2024-12-27 ENCOUNTER — TELEPHONE (OUTPATIENT)
Dept: FAMILY MEDICINE CLINIC | Facility: CLINIC | Age: 74
End: 2024-12-27
Payer: MEDICARE

## 2024-12-27 DIAGNOSIS — M17.0 PRIMARY OSTEOARTHRITIS OF BOTH KNEES: Primary | ICD-10-CM

## 2025-01-06 DIAGNOSIS — E11.9 DIABETES MELLITUS TYPE 2, NONINSULIN DEPENDENT: ICD-10-CM

## 2025-01-08 RX ORDER — PIOGLITAZONE 45 MG/1
45 TABLET ORAL DAILY
Qty: 30 TABLET | Refills: 11 | OUTPATIENT
Start: 2025-01-08

## 2025-01-08 RX ORDER — MELOXICAM 7.5 MG/1
7.5 TABLET ORAL DAILY
Qty: 30 TABLET | Refills: 11 | OUTPATIENT
Start: 2025-01-08

## 2025-01-27 ENCOUNTER — OFFICE VISIT (OUTPATIENT)
Dept: ORTHOPEDIC SURGERY | Facility: CLINIC | Age: 75
End: 2025-01-27
Payer: MEDICARE

## 2025-01-27 VITALS — WEIGHT: 264 LBS | HEIGHT: 67 IN | TEMPERATURE: 97.3 F | BODY MASS INDEX: 41.44 KG/M2

## 2025-01-27 DIAGNOSIS — M17.0 PRIMARY OSTEOARTHRITIS OF BOTH KNEES: Primary | ICD-10-CM

## 2025-01-27 DIAGNOSIS — R52 PAIN: ICD-10-CM

## 2025-01-27 RX ORDER — TRAMADOL HYDROCHLORIDE 50 MG/1
50 TABLET ORAL EVERY 6 HOURS PRN
COMMUNITY
End: 2025-02-03

## 2025-01-27 RX ADMIN — METHYLPREDNISOLONE ACETATE 80 MG: 80 INJECTION, SUSPENSION INTRA-ARTICULAR; INTRALESIONAL; INTRAMUSCULAR; SOFT TISSUE at 15:24

## 2025-01-27 RX ADMIN — LIDOCAINE HYDROCHLORIDE 2 ML: 10 INJECTION, SOLUTION EPIDURAL; INFILTRATION; INTRACAUDAL; PERINEURAL at 15:25

## 2025-01-27 RX ADMIN — LIDOCAINE HYDROCHLORIDE 2 ML: 10 INJECTION, SOLUTION EPIDURAL; INFILTRATION; INTRACAUDAL; PERINEURAL at 15:24

## 2025-01-27 RX ADMIN — METHYLPREDNISOLONE ACETATE 80 MG: 80 INJECTION, SUSPENSION INTRA-ARTICULAR; INTRALESIONAL; INTRAMUSCULAR; SOFT TISSUE at 15:25

## 2025-01-27 NOTE — PROGRESS NOTES
"Patient ID: Terra Peoples     Chief Complaint:    Chief Complaint   Patient presents with    Left Knee - Establish Care, Pain, Initial Evaluation    Right Knee - Establish Care, Pain, Initial Evaluation        HPI:    Terra Peoples is a 74 y.o. who presents today for evaluation of bilateral knee pain.  Patient states she has had symptoms for years but now worse recently.  Reports pain is generalized.  She has tried over-the-counter medications and gels.  She is currently in therapy.  She reports her primary put her on meloxicam she also has some tramadol.  Symptoms are limiting normal daily activities.  Has been using a cane.    Social History     Socioeconomic History    Marital status:    Tobacco Use    Smoking status: Never    Smokeless tobacco: Never   Vaping Use    Vaping status: Never Used   Substance and Sexual Activity    Alcohol use: No    Drug use: No    Sexual activity: Defer     Past Medical History:   Diagnosis Date    Anemia     iron deficiency    Arthritis     Chronic knee pain     Diabetes mellitus     Edema     Hyperlipidemia     developed statin related myalgias    Hypertension     Hypothyroidism     Low back pain     Multinodular goiter     Overactive bladder     Stress incontinence of urine     Thyroid nodule     Venous stasis dermatitis      Family History   Problem Relation Age of Onset    No Known Problems Mother     No Known Problems Father     No Known Problems Maternal Grandmother     No Known Problems Maternal Grandfather     No Known Problems Paternal Grandmother     No Known Problems Paternal Grandfather        ROS:    ROS:  Constitutional:  Denies fever, shaking or chills         All other pertinent positives and negatives as noted above in HPI.    Physical Exam:     Vital Signs:  Temp 97.3 °F (36.3 °C) (Temporal)   Ht 170.2 cm (67.01\")   Wt 120 kg (264 lb)   LMP  (LMP Unknown)   BMI 41.34 kg/m²   Constitutional: Awake alert and oriented x3, well developed, well " nourished, no acute distress,   Musculoskeletal:    Exam of the bilateral  knee:  Painful gait with a subtle limp  No muscle atrophy, erythema, ecchymosis, or gross deformity noted  mild knee effusion    Active range of motion 4-107  5/5 strength flexion and extension  The knee is stable to varus and valgus stress testing  Mild varus alignment of the limb  Lachman negative  Posterior drawer negative  Apollo's negative  Patellofemoral grind +  Sensation grossly intact to light tough throughout the lower extremity  Skin is intact  Distal pulses are 2+  No signs or symptoms of DVT        Diagnostic Studies:     Imaging was personally and individually reviewed and discussed at length with the patient:    4V bilateral knee(s) were taken in the office today, including AP, flexion PA, lateral, and sunrise views to evaluate the patient's complaint:  Weight bearing views show moderate degenerative changes in all three compartments with the lateral compartment being most affected and is severe/bone-on-bone.  There is early osteophyte formation throughout all three compartments.  There is no evidence of fracture or dislocation.  No periosteal reactions or medullary lesions are seen.  Patellar height and alignment are within normal limits.     Comparison films not available    AP pelvis was taken in the office today: Mild to moderate degenerative changes bilateral hip joints hard to fully visualize giving soft tissue density.            Assessment:     bilateral  Knee Osteoarthritis            Plan:     Based on x-rays, history, and office evaluation, we have diagnosed Terra Peoples with knee arthritis. At this time, we recommend starting with a conservative treatment program. This will consist of cortisone injection during significant flare-ups, NSAIDS for daily maintenance, physical therapy for strengthening and modalities as indicated, and bracing when appropriate. These measures will continue, until symptoms are no  longer relieved, become more severe or function begins to significantly deteriorate. At that point joint replacement options will be discussed.    Plan for injections.  Discussed with her to talk to her primary about her meloxicam dosage.  If she would like to do some more therapy she will give us a call.  She will need to lose weight prior to any elective surgery given current BMI 41.34.    Follow up in 3 months unless symptoms return or a new issue occurs.  Patient will call the office to schedule an appointment.     All questions were answered, the patient understands and agrees with the plan.      Patient is a diabetic instructed her to monitor sugars in the next 2 to 3 days as the injections can increase those.  She expressed understanding of this.    Large Joint Arthrocentesis: R knee  Date/Time: 1/27/2025 3:24 PM  Consent given by: patient  Site marked: site marked  Timeout: Immediately prior to procedure a time out was called to verify the correct patient, procedure, equipment, support staff and site/side marked as required   Supporting Documentation  Indications: pain   Procedure Details  Location: knee - R knee  Preparation: Patient was prepped and draped in the usual sterile fashion  Needle gauge: 21g.  Approach: lateral  Medications administered: 2 mL lidocaine PF 1% 1 %; 80 mg methylPREDNISolone acetate 80 MG/ML  Patient tolerance: patient tolerated the procedure well with no immediate complications      Large Joint Arthrocentesis: L knee  Date/Time: 1/27/2025 3:25 PM  Consent given by: patient  Site marked: site marked  Timeout: Immediately prior to procedure a time out was called to verify the correct patient, procedure, equipment, support staff and site/side marked as required   Supporting Documentation  Indications: pain   Procedure Details  Location: knee - L knee  Preparation: Patient was prepped and draped in the usual sterile fashion  Needle gauge: 21g.  Approach: lateral  Medications  administered: 2 mL lidocaine PF 1% 1 %; 80 mg methylPREDNISolone acetate 80 MG/ML  Patient tolerance: patient tolerated the procedure well with no immediate complications

## 2025-01-28 RX ORDER — METHYLPREDNISOLONE ACETATE 80 MG/ML
80 INJECTION, SUSPENSION INTRA-ARTICULAR; INTRALESIONAL; INTRAMUSCULAR; SOFT TISSUE
Status: COMPLETED | OUTPATIENT
Start: 2025-01-27 | End: 2025-01-27

## 2025-01-28 RX ORDER — LIDOCAINE HYDROCHLORIDE 10 MG/ML
2 INJECTION, SOLUTION EPIDURAL; INFILTRATION; INTRACAUDAL; PERINEURAL
Status: COMPLETED | OUTPATIENT
Start: 2025-01-27 | End: 2025-01-27

## 2025-02-03 ENCOUNTER — OFFICE VISIT (OUTPATIENT)
Dept: FAMILY MEDICINE CLINIC | Facility: CLINIC | Age: 75
End: 2025-02-03
Payer: MEDICARE

## 2025-02-03 VITALS
OXYGEN SATURATION: 99 % | SYSTOLIC BLOOD PRESSURE: 152 MMHG | BODY MASS INDEX: 41.26 KG/M2 | HEART RATE: 80 BPM | RESPIRATION RATE: 18 BRPM | DIASTOLIC BLOOD PRESSURE: 78 MMHG | WEIGHT: 262.9 LBS | HEIGHT: 67 IN

## 2025-02-03 DIAGNOSIS — M25.50 ARTHRALGIA, UNSPECIFIED JOINT: ICD-10-CM

## 2025-02-03 DIAGNOSIS — I10 PRIMARY HYPERTENSION: ICD-10-CM

## 2025-02-03 DIAGNOSIS — D64.9 ANEMIA, UNSPECIFIED TYPE: ICD-10-CM

## 2025-02-03 DIAGNOSIS — E11.40 TYPE 2 DIABETES MELLITUS WITH DIABETIC NEUROPATHY, WITHOUT LONG-TERM CURRENT USE OF INSULIN: ICD-10-CM

## 2025-02-03 DIAGNOSIS — E78.2 MIXED HYPERLIPIDEMIA: ICD-10-CM

## 2025-02-03 DIAGNOSIS — M17.0 PRIMARY OSTEOARTHRITIS OF BOTH KNEES: Primary | ICD-10-CM

## 2025-02-03 PROCEDURE — 3078F DIAST BP <80 MM HG: CPT | Performed by: NURSE PRACTITIONER

## 2025-02-03 PROCEDURE — 1125F AMNT PAIN NOTED PAIN PRSNT: CPT | Performed by: NURSE PRACTITIONER

## 2025-02-03 PROCEDURE — 99214 OFFICE O/P EST MOD 30 MIN: CPT | Performed by: NURSE PRACTITIONER

## 2025-02-03 PROCEDURE — 1160F RVW MEDS BY RX/DR IN RCRD: CPT | Performed by: NURSE PRACTITIONER

## 2025-02-03 PROCEDURE — 3077F SYST BP >= 140 MM HG: CPT | Performed by: NURSE PRACTITIONER

## 2025-02-03 PROCEDURE — 1111F DSCHRG MED/CURRENT MED MERGE: CPT | Performed by: NURSE PRACTITIONER

## 2025-02-03 PROCEDURE — 1159F MED LIST DOCD IN RCRD: CPT | Performed by: NURSE PRACTITIONER

## 2025-02-03 RX ORDER — TRAMADOL HYDROCHLORIDE 50 MG/1
50 TABLET ORAL NIGHTLY PRN
Qty: 30 TABLET | Refills: 0 | Status: SHIPPED | OUTPATIENT
Start: 2025-02-03

## 2025-02-03 RX ORDER — OLMESARTAN MEDOXOMIL 20 MG/1
20 TABLET ORAL DAILY
Qty: 90 TABLET | Refills: 3 | Status: SHIPPED | OUTPATIENT
Start: 2025-02-03

## 2025-02-03 RX ORDER — ATORVASTATIN CALCIUM 10 MG/1
10 TABLET, FILM COATED ORAL DAILY
Qty: 90 TABLET | Refills: 3 | Status: SHIPPED | OUTPATIENT
Start: 2025-02-03

## 2025-02-03 RX ORDER — AMLODIPINE BESYLATE 10 MG/1
10 TABLET ORAL DAILY
Qty: 90 TABLET | Refills: 0 | Status: SHIPPED | OUTPATIENT
Start: 2025-02-03

## 2025-02-03 NOTE — PROGRESS NOTES
"Transitional Care Follow Up Visit  Subjective     Terra Peoples is a 74 y.o. female who presents for a transitional care management visit.    Within 48 business hours after discharge our office contacted her via telephone to coordinate her care and needs.      I reviewed and discussed the details of that call along with the discharge summary, hospital problems, inpatient lab results, inpatient diagnostic studies, and consultation reports with Terra.     Current outpatient and discharge medications have been reconciled for the patient.  Reviewed by: Emani Escalera MA           No data to display              Risk for Readmission (LACE) No data recorded    History of Present Illness   Course During Hospital Stay:  ***     {Common H&P Review Areas:00847}    Review of Systems    Objective   /78   Pulse 80   Resp 18   Ht 170.2 cm (67\")   Wt 119 kg (262 lb 14.4 oz)   SpO2 99%   BMI 41.18 kg/m²   Physical Exam    Assessment & Plan   {Assess/PlanSmartLinks:38217}               " CARDIOLOGY OFFICE NOTE        ORIGINAL REASON FOR CONSULT:  Pre-op Cardiac Clearance (07/30/2021)  Wendy Fierro MD   [address] [place] [address] [place] [id number]  Jaqueline Bonilla is a 67 year old female with the following issues: CARDIAC INVESTIGATIONS/IMAGING   Age 67  HTN (Keyla 17 ml/m²) / Dyslipidemia  White Coat Hypertension  HFpEF  Mild CAD (see CTA 08/26/21)  DM  Cold-induced Asthma  Hyperparathyroidism s/p parathyroidectomy 10/21/21 (Dr. Levine)  Diverticulosis  Morbid obesity  OA in knees    NT proBNP: None  10 year ASCVD risk: 13.7%  [id number] Score: NA   H2FpEF Score: 5  NYHA FC: 2-3 Echo (08/10/2021): LVEF 70% RVSP 29.5 mmHg E/e' 10 Keyla 16.8 ml/m²  ECG (07/21/21): SR, T wave abnormality  CTA Coronaries (08/26/21): CA score 0. Nonobstructive noncalcified atherosclerotic plaque visualized in epicardial Coronary Arteries. 25-49% in mid-LAD.   Gi WORKUP: Colonoscopy (12/13/2010): Colon normal. Diverticulosis in sigmoid and descending colon. External and internal hemorrhoids.         Ms. Bonilla presents in follow up after 09/24/2021.  At our last visit, we recommended discontinuing hydrochlorothiazide, starting Carvedilol 6.25 mg BID, starting Bumex 1 mg daily, starting KCl 10 mEQ daily, starting Jardiance 10 mg daily, continuing all other current medications, and proceeding with hyperparathyroid surgery.  Since that time, on 10/21/21 patient underwent parathyroidectomy. To note, patient called our office and reported she hadn't picked up Jardiance due to the cost.     Today, she reports doing well. She previously complained of mild dyspnea. She states this has resolved since hydrochlorothiazide was switched to Bumex, although she does report chronic dyspnea.   Activity remains limited due to chronic knee pain.   She does have BP cuff at home and takes her BP at home daily. She states that BPs are generally well controlled (avg in the 120s SBP).   Patient at this time denies history of palpitations,  lightheadedness/dizziness, presyncope/syncope, edema and any anginal type chest pain.    ORIGINAL CONSULTATION     Originally presented on 21 for pre-op cardiac clearance. Patient saw [facility] on 21 for preoperative medical clearance (R parathyroidectomy on 8/3/21). Reason for procedure is primary hyperparathyroidism that has failed conservative treatments with persistent hypercalcemia. Patient denied any chest pain with activity but did endorse some SOB with activity. Most recent ECG showed SR with poor R wave progression and ST-T wave changes and was then referred to cardiology for cardiac clearance. At the original OV she denied any symptoms associated with her hyperparathyroidism. She did report shortness of breath after walking certain distances but was limited in her walking because of arthritis.     CARDIAC MEDICATION CHANGES     21: HCTZ discontinued, Carvedilol 6.25 mg BID started, Bumex 1 mg daily started, KCL 10 mEq BID started, Jardiance 10 mg daily recommended but never started due to cost    ACTIVITY LEVEL     Limited activity level because of arthritis.     REVIEW OF SYSTEMS     Negative other than what has been specified in the history.     LAST HOSPITALIZATION     NA    PERSONAL/SOCIAL HISTORY     Lives close in [place] with  (Abhijeet;  39 years).  No children.  Have had pets in the past; considering getting cats  Retired in  as an LPN at [employer]    FAMILY HISTORY     3 siblings (brother and sister both have hypertension; brother also has hyperparathyroidism)  Father  8 years ago of C-diff at age 85. Mother is 87  No family history of SCD (sudden cardiac death) or premature CAD     SURGICAL HISTORY     Past Surgical History:   Procedure Laterality Date   • Colonoscopy diagnostic  2010    nml, Dr Somers f/u 10yrs   • Condyloma excision/fulguration     • Explore parathyroid glands Right 10/21/2021    Right superior type B; ioPTH drop 73% ; Dr. Levine   •  Ganglion cyst excision Right    • Tonsillectomy       KEESHA RISK ASSESSMENT     NA    PAD/AAA RISK ASSESSMENT (ULTRASOUND/ABIs)     NA (LTNS)    CURRENT MEDICATIONS     Current Outpatient Medications   Medication Sig Dispense Refill   • carvedilol (COREG) 6.25 MG tablet Take 1 tablet by mouth 2 times daily (with meals). 180 tablet 3   • bumetanide (BUMEX) 1 MG tablet Take 1 tablet by mouth daily. 90 tablet 3   • potassium chloride (KLOR-CON) 10 MEQ ER tablet Take 1 tablet by mouth 2 times daily. 90 tablet 3   • atorvastatin (LIPITOR) 20 MG tablet Take 1 tablet by mouth daily. 90 tablet 4   • losartan (Cozaar) 100 MG tablet Take 1 tablet by mouth daily. 90 tablet 4   • metFORMIN (GLUCOPHAGE-XR) 500 MG 24 hr tablet Take 2 tablets by mouth daily (with breakfast). 180 tablet 4   • Turmeric Curcumin 500 MG Cap Take 500 mg by mouth daily.      • acetaminophen (TYLENOL) 500 MG tablet Take 500 mg by mouth every 6 hours as needed for Pain.     • albuterol 108 (90 Base) MCG/ACT inhaler Inhale 2 puffs into the lungs every 4 hours as needed for Shortness of Breath or Wheezing. 1 Inhaler 4   • Ginger, Zingiber officinalis, (GINGER PO) Take by mouth as needed (motion sickness, for travel).      • Omega-3 Fatty Acids (FISH OIL PO) Take by mouth daily.      • Cholecalciferol (VITAMIN D) 1000 UNITS capsule Take 1,000 Units by mouth daily.       • Magnesium Gluconate 250 MG TABS Take 250 mg by mouth daily.  120 tablet    • Coenzyme Q10 (Co Q 10) 100 MG Cap Take by mouth daily.  30 capsule    • multivitamin (THERAGRAN) per tablet Take 1 tablet by mouth daily.       No current facility-administered medications for this visit.      PHYSICAL EXAMINATION     [facility] Extended Vitals - Weight in Kg/Lb 10/21/2021 11/3/2021 11/3/2021 12/29/2021 1/14/2022   /64 - 138/73 176/72 136/66   Pulse 62 - 79 74 79   Resp 18 - - - -   Temp - - - - -   Weight kg - - - 116.348 kg 117.708 kg   Weight lb - - - 256 lb 8 oz 259 lb 8 oz   Height - - - - 5' 2\"    Height cm - - - - 157.5 cm   BMI - - - - 47.46   Pulse Ox 98 - - 97 96   Repeat BP 1 - - - - -   Repeat BP 1 Position - - - - -   Repeat BP 1 Cuff - - - - -   Repeat BP 1 Arm - - - - -   Peak Flow (Best) - 97 - - -   Last Menstrual Period - - - - -   Patient Position Semi-Rosales's - - Sitting Sitting   BP Location LUE - Left upper extremity - - LUE - Left upper extremity LUE - Left upper extremity   Cuff Size - - - Large Adult Large Adult     General : No acute distress  HEENT: PERRL, Normocephalic/atraumatic, clear oropharynx  Neck: Supple, FROM. Trachea central. No JVD (jugular vein distention) or carotid bruit.  CVS: S1, S2 normal. No M/R/G  Pulm: Clear to auscultation/percussion. No Wheeze/Rhonchi/Rales  Ext: No cyanosis/clubbing 1+ edema, mild varicosities  Skin: No rash/palpable nodules    LABORATORY     Recent Labs   Lab 12/29/21  1302 10/28/21  1310 10/21/21  0830 07/07/21  0958 05/25/21  1030   HGB  --   --  13.2 14.6  --    PLT  --   --  265 297  --    Sodium 145  --   --  143 141   Potassium 4.2  --   --  4.1 4.0   BUN 18  --   --  19 24*   Creatinine 0.79  --   --  0.84  0.84 0.92   Glomerular Filtration Rate 78  --   --  73*  73* 66*   TSH  --   --   --   --  1.339   Vitamin D, 25-Hydroxy 44.4 34.2  --   --  30.4          ECHOCARDIOGRAM (08/10/2021)     Normal LV size and systolic function, EF 70 %. E/e' 10 (average).  2. Normal RV size and systolic function, PASP 30 mmHg.  3. Trace mitral and tricuspid valve regurgitation.  4. No pericardial effusion.    UPCOMING APPOINTMENTS     Future Appointments   Date Time Provider Department Center   1/14/2022  1:00 PM MD [facility]EL[facility] Zuniga     Jaqueline Bonilla is a 67 year old female with the following cardiovascular profile:    Age 67  Hypertension/Dyslipidemia  Diabetes mellitus  Morbid obesity    Middle-aged female with abnormal electrocardiogram= non-specific ischemic change  Ischemic assessment is negative  Elements and phenotype  of HFpEF= has responded to Bumex  Activity in general is low because of severe arthritis    RECOMMENDATIONS     Continue current medication regimen.   Increase activity as tolerated.    Follow up in 9 months.    I, Meron Jackson, attest that I performed the duties of scribe in the presence of YULI Gill MD.     The documentation recorded by the scribe accurately and completely reflects the service(s) I personally performed and the decisions made by me.     I, YULI Gill, have personally taken a history, performed a physical examination of the patient, reviewed the clinical data, labs, imagings, ecg. I have reviewed and edited the above note as needed. I have personally formulated the clinical impression and recommendations as stated.       YULI MARTIN MD  [phone]

## 2025-02-03 NOTE — PROGRESS NOTES
Newton Peoples is a 74 y.o. female.     History of Present Illness     The following portions of the patient's history were reviewed and updated as appropriate: allergies, current medications, past family history, past medical history, past social history, past surgical history and problem list.       The patient is a 74-year-old female who presents for a post-hospitalization follow-up after being admitted to the ER, istal and then rehab post pneumonia and sepsis.     She was transported to the ER via ambulance on 11/25/2024 and subsequently admitted to [facility] for septicemia and pneumonia.. During her hospitalization, she underwent a bronchoscopy performed by the pulmonology team. She was discharged on 12/09/2024 and spent a week at [facility] for rehabilitation before returning home. Since her discharge, she has been residing with her son and reports feeling well overall. She reports a satisfactory appetite but notes a diminished sense of taste, which she attributes to a recent cold. She recalls an incident where a medication dissolved in her mouth before she could swallow it, resulting in a temporary loss of taste. She does not report any sore throat. She experiences shortness of breath only during periods of rushing and does not report any swelling. Had cardiology consult during hospitalization without follow up.     Chronic Dm2 x years. She has been managing her diabetes with metformin 1000 mg twice daily, which she tolerates well without any gastrointestinal side effects. She is also on pioglitazone 45 mg but has discontinued glipizide. She reports slightly elevated blood sugar levels, which she attributes to the discontinuation of glipizide approximately 7 to 8 months ago due to a lack of refills.    Chronic, worsening osteoarthritis. Bilat knees are worst. She has been using meloxicam 7.5 mg for pain management and requests refills. She has not sought consultation from a pain  specialist or an orthopedic doctor. She has an upcoming appointment withorthofor her knee pain in 04/2025. She has found relief from her knee pain following an injections and uses a cane for mobility. She has installed a lift for assistance with toilet use and manages to get in and out of bed independently. She believes physical therapy is  beneficial. She has home health currently. She has been taking tramadol once daily for severe pain and meloxicam at night. Rehab started tramadol for pain. She is asking for refill.      She has been taking vitamin D supplements and iron. Unsure if she needs to continue    She has been taking amlodipine 10 mg and olmesartan 20 mg for chronic hypertension. She did not take her blood pressure medication today due to rushing to make it to her appointment.    She has been taking atorvastatin 10 mg for cholesterol management.    MEDICATIONS  Current: Metformin, pioglitazone, vitamin D, tramadol, meloxicam, amlodipine, olmesartan, atorvastatin, B12.  Discontinued: Glipizide, Myrbetriq.         Review of Systems        Current Outpatient Medications:     amLODIPine (NORVASC) 10 MG tablet, Take 1 tablet by mouth Daily., Disp: 90 tablet, Rfl: 0    atorvastatin (LIPITOR) 10 MG tablet, Take 1 tablet by mouth Daily., Disp: 90 tablet, Rfl: 3    Cyanocobalamin (VITAMIN B-12 PO), Take  by mouth., Disp: , Rfl:     Ferrous Sulfate 27 MG tablet, Take 1 tablet by mouth Daily With Breakfast., Disp: 30 tablet, Rfl: 3    meloxicam (MOBIC) 7.5 MG tablet, TAKE 1 TABLET BY MOUTH DAILY, Disp: 30 tablet, Rfl: 0    metFORMIN (GLUCOPHAGE) 1000 MG tablet, TAKE 1 TABLET BY MOUTH TWICE  DAILY, Disp: 60 tablet, Rfl: 0    olmesartan (BENICAR) 20 MG tablet, Take 1 tablet by mouth Daily., Disp: 90 tablet, Rfl: 3    pioglitazone (ACTOS) 45 MG tablet, TAKE 1 TABLET BY MOUTH DAILY, Disp: 30 tablet, Rfl: 0    vitamin D (ERGOCALCIFEROL) 1.25 MG (23272 UT) capsule capsule, TAKE 1 CAPSULE BY MOUTH ONCE  WEEKLY, Disp: 13  capsule, Rfl: 3    glucose blood test strip, OneTouch Ultra Blue In Vitro Strip; Patient Sig: OneTouch Ultra Blue In Vitro Strip TO TEST FOUR TIMES DAILY AND AS NEEDED; 2; 6; 28-Oct-2014; Active (Patient not taking: Reported on 2/3/2025), Disp: , Rfl:     traMADol (ULTRAM) 50 MG tablet, Take 1 tablet by mouth At Night As Needed for Moderate Pain (use sparingly for knee pain)., Disp: 30 tablet, Rfl: 0    Objective   Physical Exam  Constitutional:       Appearance: Normal appearance. She is obese.   HENT:      Mouth/Throat:      Mouth: Mucous membranes are moist.   Cardiovascular:      Rate and Rhythm: Normal rate and regular rhythm.      Pulses: Normal pulses.      Heart sounds: Normal heart sounds.   Pulmonary:      Effort: Pulmonary effort is normal.      Breath sounds: Normal breath sounds.   Abdominal:      General: Bowel sounds are normal.   Musculoskeletal:         General: Swelling and tenderness present. Normal range of motion.      Comments: Bilat knees      Skin:     General: Skin is warm.   Neurological:      General: No focal deficit present.      Mental Status: She is alert.         Vitals:    02/03/25 1259   BP: 152/78   Pulse: 80   Resp: 18   SpO2: 99%     Body mass index is 41.18 kg/m².    Procedures    TSH   Date Value Ref Range Status   01/08/2024 0.833 0.450 - 4.500 uIU/mL Final     Hemoglobin A1C   Date Value Ref Range Status   01/08/2024 6.0 (H) 4.8 - 5.6 % Final     Comment:              Prediabetes: 5.7 - 6.4           Diabetes: >6.4           Glycemic control for adults with diabetes: <7.0              Over the past 2 weeks, how often have you been bothered by any of the following problems?  Little interest or pleasure in doing things: Not at all  Feeling down, depressed, or hopeless: Not at all      Assessment & Plan   Problems Addressed this Visit       BP (high blood pressure)    Relevant Medications    amLODIPine (NORVASC) 10 MG tablet    olmesartan (BENICAR) 20 MG tablet    Other Relevant  Orders    Comprehensive Metabolic Panel    CBC & Differential    Lipid Panel With / Chol / HDL Ratio     Other Visit Diagnoses       Primary osteoarthritis of both knees    -  Primary    Relevant Medications    traMADol (ULTRAM) 50 MG tablet    Other Relevant Orders    Drug screen panel 1, serum    Mixed hyperlipidemia        Relevant Medications    atorvastatin (LIPITOR) 10 MG tablet    Type 2 diabetes mellitus with diabetic neuropathy, without long-term current use of insulin        Relevant Orders    Hemoglobin A1c    Anemia, unspecified type        Relevant Orders    Iron and TIBC    Ferritin    Vitamin B12    Folate    Arthralgia, unspecified joint        Relevant Orders    Drug screen panel 1, serum          Diagnoses         Codes Comments    Primary osteoarthritis of both knees    -  Primary ICD-10-CM: M17.0  ICD-9-CM: 715.16     Primary hypertension     ICD-10-CM: I10  ICD-9-CM: 401.9     Mixed hyperlipidemia     ICD-10-CM: E78.2  ICD-9-CM: 272.2     Type 2 diabetes mellitus with diabetic neuropathy, without long-term current use of insulin     ICD-10-CM: E11.40  ICD-9-CM: 250.60, 357.2     Anemia, unspecified type     ICD-10-CM: D64.9  ICD-9-CM: 285.9     Arthralgia, unspecified joint     ICD-10-CM: M25.50  ICD-9-CM: 719.40           Orders Placed This Encounter   Procedures    Comprehensive Metabolic Panel     Order Specific Question:   Release to patient     Answer:   Routine Release [[id number]]    Lipid Panel With / Chol / HDL Ratio     Order Specific Question:   Release to patient     Answer:   Routine Release [[id number]]    Hemoglobin A1c     Order Specific Question:   Release to patient     Answer:   Routine Release [[id number]]    Iron and TIBC     Order Specific Question:   Release to patient     Answer:   Routine Release [[id number]]    Ferritin     Order Specific Question:   Release to patient     Answer:   Routine Release [[id number]]    Vitamin B12     Order Specific Question:   Release to  patient     Answer:   Routine Release [[id number]]    Folate     Order Specific Question:   Release to patient     Answer:   Routine Release [[id number]]    Drug screen panel 1, serum     Order Specific Question:   Release to patient     Answer:   Routine Release [[id number]]    CBC & Differential     Order Specific Question:   Release to patient     Answer:   Routine Release [[id number]]          1. Post-hospitalization follow-up.  She was admitted for septicemia and pneumonia. She saw pulmonology and had a bronchoscopy. She was discharged on 12/09/2024. She has sepsis, pneumonia, diabetes, and hypertension. Imaging of her brain revealed a meningioma on the right side, necessitating a repeat MRI in 6 months. This condition is not contributing to her current symptoms. Her echocardiogram results were satisfactory, despite a recent bout of pneumonia. A comprehensive blood work panel will be ordered today to assess her iron, B12, and glucose levels. She is advised to discontinue her iron supplement unless specifically recommended by a healthcare professional due to its potential to cause constipation. She is encouraged to maintain good oral hygiene, including brushing her tongue and using Listerine.    2. Type 2 Diabetes Mellitus.  Her A1c was 6.9. She is currently on Metformin 1000 mg twice daily and pioglitazone 45 mg. She reports that her blood sugar has been a little high because she is not taking glipizide anymore. Blood sugar levels will be checked today to determine if glipizide is needed.    3. Hypertension.  Her blood pressure is elevated today, likely due to rushing to the appointment. She is currently on amlodipine 10 mg and olmesartan 20 mg. Refills for amlodipine and olmesartan have been provided.    4. Hyperlipidemia.  She is on atorvastatin 10 mg. A refill for atorvastatin has been provided.    5. Knee Pain.  She reports that her knee pain has improved since receiving an injection. She uses a cane and  has a lift for the toilet. She has been taking meloxicam 7.5 mg and tramadol sparingly for pain. She is advised to take meloxicam once daily and tramadol no more than once daily, with the option to split the dose into half during the day and half at night. A prescription for tramadol has been provided, and she is required to sign a form agreeing to take the medication as directed. A drug screen will be conducted annually. She is scheduled to see Dr. Taylor  for her knee pain in April 2025. Physical therapy has been ordered. Full side effect profile and risks reviewed     6. Mild Anemia.  Blood work will be done today to check her iron levels. She is advised not to take iron supplements unless recommended by a healthcare professional due to the risk of constipation.    Follow-up  The patient is scheduled for a follow-up visit in 3 months.    PROCEDURE  Bronchoscopy was performed during the recent hospitalization.              Education provided in AVS   Return in about 3 months (around 5/3/2025) for Medicare Wellness.    Patient or patient representative verbalized consent for the use of Ambient Listening during the visit with  HAYDE Yanez for chart documentation. 2/3/2025  13:45 EST

## 2025-02-04 LAB
ALBUMIN SERPL-MCNC: 4 G/DL (ref 3.8–4.8)
ALP SERPL-CCNC: 84 IU/L (ref 44–121)
ALT SERPL-CCNC: 10 IU/L (ref 0–32)
AST SERPL-CCNC: 12 IU/L (ref 0–40)
BASOPHILS # BLD AUTO: 0 X10E3/UL (ref 0–0.2)
BASOPHILS NFR BLD AUTO: 0 %
BILIRUB SERPL-MCNC: 0.2 MG/DL (ref 0–1.2)
BUN SERPL-MCNC: 11 MG/DL (ref 8–27)
BUN/CREAT SERPL: 13 (ref 12–28)
CALCIUM SERPL-MCNC: 10.4 MG/DL (ref 8.7–10.3)
CHLORIDE SERPL-SCNC: 105 MMOL/L (ref 96–106)
CHOLEST SERPL-MCNC: 193 MG/DL (ref 100–199)
CHOLEST/HDLC SERPL: 2.7 RATIO (ref 0–4.4)
CO2 SERPL-SCNC: 22 MMOL/L (ref 20–29)
CREAT SERPL-MCNC: 0.85 MG/DL (ref 0.57–1)
EGFRCR SERPLBLD CKD-EPI 2021: 72 ML/MIN/1.73
EOSINOPHIL # BLD AUTO: 0.1 X10E3/UL (ref 0–0.4)
EOSINOPHIL NFR BLD AUTO: 1 %
ERYTHROCYTE [DISTWIDTH] IN BLOOD BY AUTOMATED COUNT: 13.4 % (ref 11.7–15.4)
FERRITIN SERPL-MCNC: 137 NG/ML (ref 15–150)
FOLATE SERPL-MCNC: 10.2 NG/ML
GLOBULIN SER CALC-MCNC: 3.1 G/DL (ref 1.5–4.5)
GLUCOSE SERPL-MCNC: 139 MG/DL (ref 70–99)
HBA1C MFR BLD: 6.2 % (ref 4.8–5.6)
HCT VFR BLD AUTO: 36 % (ref 34–46.6)
HDLC SERPL-MCNC: 71 MG/DL
HGB BLD-MCNC: 11.4 G/DL (ref 11.1–15.9)
IMM GRANULOCYTES # BLD AUTO: 0 X10E3/UL (ref 0–0.1)
IMM GRANULOCYTES NFR BLD AUTO: 0 %
IRON SATN MFR SERPL: 16 % (ref 15–55)
IRON SERPL-MCNC: 44 UG/DL (ref 27–139)
LDLC SERPL CALC-MCNC: 109 MG/DL (ref 0–99)
LYMPHOCYTES # BLD AUTO: 1.6 X10E3/UL (ref 0.7–3.1)
LYMPHOCYTES NFR BLD AUTO: 27 %
MCH RBC QN AUTO: 28.6 PG (ref 26.6–33)
MCHC RBC AUTO-ENTMCNC: 31.7 G/DL (ref 31.5–35.7)
MCV RBC AUTO: 90 FL (ref 79–97)
MONOCYTES # BLD AUTO: 0.4 X10E3/UL (ref 0.1–0.9)
MONOCYTES NFR BLD AUTO: 6 %
NEUTROPHILS # BLD AUTO: 3.8 X10E3/UL (ref 1.4–7)
NEUTROPHILS NFR BLD AUTO: 66 %
PLATELET # BLD AUTO: 271 X10E3/UL (ref 150–450)
POTASSIUM SERPL-SCNC: 3.8 MMOL/L (ref 3.5–5.2)
PROT SERPL-MCNC: 7.1 G/DL (ref 6–8.5)
RBC # BLD AUTO: 3.99 X10E6/UL (ref 3.77–5.28)
SODIUM SERPL-SCNC: 143 MMOL/L (ref 134–144)
TIBC SERPL-MCNC: 271 UG/DL (ref 250–450)
TRIGL SERPL-MCNC: 74 MG/DL (ref 0–149)
UIBC SERPL-MCNC: 227 UG/DL (ref 118–369)
VIT B12 SERPL-MCNC: 1491 PG/ML (ref 232–1245)
VLDLC SERPL CALC-MCNC: 13 MG/DL (ref 5–40)
WBC # BLD AUTO: 5.8 X10E3/UL (ref 3.4–10.8)

## 2025-02-07 LAB
AMPHETAMINES SERPL QL SCN: NEGATIVE NG/ML
BARBITURATES SERPL QL SCN: NEGATIVE UG/ML
BENZODIAZ SERPL QL SCN: NEGATIVE NG/ML
CANNABINOIDS SERPL QL SCN: NEGATIVE NG/ML
COCAINE+BZE SERPL QL SCN: NEGATIVE NG/ML
METHADONE SERPL QL SCN: NEGATIVE NG/ML
OPIATES SERPL QL SCN: NEGATIVE NG/ML
OXYCODONE+OXYMORPHONE SERPLBLD QL SCN: NEGATIVE NG/ML
PCP SERPL QL SCN: NEGATIVE NG/ML
PROPOXYPH SERPL QL SCN: NEGATIVE NG/ML

## 2025-03-18 DIAGNOSIS — M17.0 PRIMARY OSTEOARTHRITIS OF BOTH KNEES: ICD-10-CM

## 2025-03-18 RX ORDER — TRAMADOL HYDROCHLORIDE 50 MG/1
50 TABLET ORAL NIGHTLY PRN
Qty: 30 TABLET | Refills: 0 | Status: SHIPPED | OUTPATIENT
Start: 2025-03-18

## 2025-03-18 NOTE — TELEPHONE ENCOUNTER
Caller: Terra Peoples    Relationship: Self    Best call back number: [phone]    Requested Prescriptions:   Requested Prescriptions     Pending Prescriptions Disp Refills    traMADol (ULTRAM) 50 MG tablet 30 tablet 0     Sig: Take 1 tablet by mouth At Night As Needed for Moderate Pain (use sparingly for knee pain).        Pharmacy where request should be sent: [id number] - [facility] [address] [facility] AT [address] [place] [address] [phone] [facility] [phone] FX     Last office visit with prescribing clinician: 2/3/2025   Last telemedicine visit with prescribing clinician: Visit date not found   Next office visit with prescribing clinician: 4/28/2025     Additional details provided by patient: PATIENT IS OUT OF THIS MEDICATION AND HER MAIL ORDER IS OUT OF STOCK.     Does the patient have less than a 3 day supply:  [x] Yes  [] No    Would you like a call back once the refill request has been completed: [] Yes [x] No    If the office needs to give you a call back, can they leave a voicemail: [] Yes [x] No    Caryn Solis Rep   03/18/25 13:37 EDT

## 2025-03-30 DIAGNOSIS — I10 PRIMARY HYPERTENSION: Primary | ICD-10-CM

## 2025-03-31 RX ORDER — AMLODIPINE BESYLATE 10 MG/1
10 TABLET ORAL DAILY
Qty: 90 TABLET | Refills: 3 | Status: SHIPPED | OUTPATIENT
Start: 2025-03-31

## 2025-04-28 ENCOUNTER — OFFICE VISIT (OUTPATIENT)
Dept: FAMILY MEDICINE CLINIC | Facility: CLINIC | Age: 75
End: 2025-04-28
Payer: MEDICARE

## 2025-04-28 VITALS
HEIGHT: 67 IN | RESPIRATION RATE: 18 BRPM | HEART RATE: 93 BPM | BODY MASS INDEX: 41.18 KG/M2 | SYSTOLIC BLOOD PRESSURE: 152 MMHG | OXYGEN SATURATION: 92 % | DIASTOLIC BLOOD PRESSURE: 80 MMHG

## 2025-04-28 DIAGNOSIS — M17.10 ARTHRITIS OF KNEE: Primary | ICD-10-CM

## 2025-04-28 NOTE — PROGRESS NOTES
Transitional Care Follow Up Visit  Subjective     Terra Peoples is a 74 y.o. female who presents for a transitional care management visit.    Within 48 business hours after discharge our office contacted her via telephone to coordinate her care and needs.      I reviewed and discussed the details of that call along with the discharge summary, hospital problems, inpatient lab results, inpatient diagnostic studies, and consultation reports with Terra.     Current outpatient and discharge medications have been reconciled for the patient.  Reviewed by: Mary Jo Bejarano MA           No data to display            [facility] 4/21--4/25/25 admission     Dc to rehab [facility] 4/25/25 to current     Risk for Readmission (LACE) No data recorded    History of Present Illness   Course During Hospital Stay:  Fall at home, incontinence and weakness, unable to walk. She was treated for UTI and saw ortho and had bilat knee injections  She is using rollator and doing physical therapy at rehab       {Common H&P Review Areas:99363}    Review of Systems    Objective   There were no vitals taken for this visit.  Physical Exam  Vitals reviewed.   Constitutional:       Appearance: Normal appearance. She is obese.   Cardiovascular:      Rate and Rhythm: Normal rate and regular rhythm.      Pulses: Normal pulses.      Heart sounds: Normal heart sounds.   Abdominal:      General: Bowel sounds are normal.      Palpations: Abdomen is soft.   Skin:     General: Skin is warm.   Neurological:      General: No focal deficit present.      Mental Status: She is alert.       Assessment & Plan   {Assess/PlanSmartLinks:28751}

## 2025-04-30 NOTE — PROGRESS NOTES
Subjective   Terra Peoples is a 74 y.o. female.     History of Present Illness     Patient presents to the office with her son today.  She was Admitted to [facility] 421 through 425 for weakness knee pain.  She was treated for UTI given injections in her knees and discharged to subacute rehab.  Patient and son mistakenly came to office today thinking that we were going to inject her knees.  She is walking with rollator unassisted.  It does take time and effort to stand.  This is complicated by obesity and deconditioning.  She reports she is in subacute rehab for 21 days.  She has orthopedic follow-up.  No additional concerns today  The following portions of the patient's history were reviewed and updated as appropriate: allergies, current medications, past family history, past medical history, past social history, past surgical history, and problem list.      Review of Systems    Objective   Physical Exam  Constitutional:       Appearance: She is obese.   Cardiovascular:      Pulses: Normal pulses.      Heart sounds: Normal heart sounds.   Pulmonary:      Effort: Pulmonary effort is normal.      Breath sounds: Normal breath sounds.   Skin:     General: Skin is warm.   Neurological:      Mental Status: She is alert. Mental status is at baseline.         Assessment & Plan            Advised patient that she needs to report back to subacute rehab follow-up with Ortho as ordered and I will see her when she is released from rehab

## 2025-05-05 ENCOUNTER — OFFICE VISIT (OUTPATIENT)
Dept: ORTHOPEDIC SURGERY | Facility: CLINIC | Age: 75
End: 2025-05-05
Payer: MEDICARE

## 2025-05-05 VITALS — TEMPERATURE: 97.7 F | HEIGHT: 69 IN | WEIGHT: 237.7 LBS | BODY MASS INDEX: 35.21 KG/M2

## 2025-05-05 DIAGNOSIS — M17.0 PRIMARY OSTEOARTHRITIS OF BOTH KNEES: Primary | ICD-10-CM

## 2025-05-05 RX ORDER — LIDOCAINE HYDROCHLORIDE 10 MG/ML
2 INJECTION, SOLUTION EPIDURAL; INFILTRATION; INTRACAUDAL; PERINEURAL
Status: COMPLETED | OUTPATIENT
Start: 2025-05-05 | End: 2025-05-05

## 2025-05-05 RX ORDER — METHYLPREDNISOLONE ACETATE 80 MG/ML
40 INJECTION, SUSPENSION INTRA-ARTICULAR; INTRALESIONAL; INTRAMUSCULAR; SOFT TISSUE
Status: COMPLETED | OUTPATIENT
Start: 2025-05-05 | End: 2025-05-05

## 2025-05-05 RX ADMIN — METHYLPREDNISOLONE ACETATE 40 MG: 80 INJECTION, SUSPENSION INTRA-ARTICULAR; INTRALESIONAL; INTRAMUSCULAR; SOFT TISSUE at 15:51

## 2025-05-05 RX ADMIN — LIDOCAINE HYDROCHLORIDE 2 ML: 10 INJECTION, SOLUTION EPIDURAL; INFILTRATION; INTRACAUDAL; PERINEURAL at 15:51

## 2025-05-05 RX ADMIN — METHYLPREDNISOLONE ACETATE 40 MG: 80 INJECTION, SUSPENSION INTRA-ARTICULAR; INTRALESIONAL; INTRAMUSCULAR; SOFT TISSUE at 15:50

## 2025-05-05 RX ADMIN — LIDOCAINE HYDROCHLORIDE 2 ML: 10 INJECTION, SOLUTION EPIDURAL; INFILTRATION; INTRACAUDAL; PERINEURAL at 15:50

## 2025-05-05 NOTE — PROGRESS NOTES
"Knee follow-up      Patient: Terra Peoples        YOB: 1950            Chief Complaints: Knee pain      History of Present Illness: Patient presents with acute on chronic bilateral knee pain.      Physical Exam: 74 y.o. female  General Appearance:    Alert, cooperative, in no acute distress                   Vitals:    05/05/25 1521   Temp: 97.7 °F (36.5 °C)   TempSrc: Temporal   Weight: 108 kg (237 lb 11.2 oz)   Height: 175.3 cm (69\")   PainSc: 0-No pain   PainLoc: Knee  Comment: left and right      Patient is alert and read ×3 no acute distress appears her above-listed at height weight and age.  Affect is normal respiratory rate is normal unlabored. Heart rate regular rate rhythm, sclera, dentition and hearing are normal for the purpose of this exam.  Exam and complaints are unchanged.      Exam unchanged    Procedure:  Bilateral knee steroid injection      Recent hemoglobin A1c was reviewed shows to be 6.2.  And has been stable under 7    Assessment.  Acute on chronic bilateral knee pain, knee osteoarthritis      Plan: Continue conservative treatment.  We will proceed with bilateral knee steroid injections.  Continue over-the-counter medications and conservative measures as previously discussed.    Patient does have diabetes instructed to monitor sugars over the next couple days as injection can increase though she expressed understanding.    Large Joint Arthrocentesis: R knee  Date/Time: 5/5/2025 3:50 PM  Consent given by: patient  Site marked: site marked  Timeout: Immediately prior to procedure a time out was called to verify the correct patient, procedure, equipment, support staff and site/side marked as required   Supporting Documentation  Indications: pain   Procedure Details  Location: knee - R knee  Preparation: Patient was prepped and draped in the usual sterile fashion  Needle gauge: 21g.  Approach: lateral  Medications administered: 2 mL lidocaine PF 1% 1 %; 40 mg methylPREDNISolone " acetate 80 MG/ML  Patient tolerance: patient tolerated the procedure well with no immediate complications      Large Joint Arthrocentesis: L knee  Date/Time: 5/5/2025 3:51 PM  Consent given by: patient  Site marked: site marked  Timeout: Immediately prior to procedure a time out was called to verify the correct patient, procedure, equipment, support staff and site/side marked as required   Supporting Documentation  Indications: pain   Procedure Details  Location: knee - L knee  Preparation: Patient was prepped and draped in the usual sterile fashion  Needle gauge: 21g.  Approach: lateral  Medications administered: 2 mL lidocaine PF 1% 1 %; 40 mg methylPREDNISolone acetate 80 MG/ML  Patient tolerance: patient tolerated the procedure well with no immediate complications

## 2025-05-08 ENCOUNTER — TELEPHONE (OUTPATIENT)
Dept: FAMILY MEDICINE CLINIC | Facility: CLINIC | Age: 75
End: 2025-05-08
Payer: MEDICARE

## 2025-05-08 NOTE — TELEPHONE ENCOUNTER
Left message to call office, she is due medicare wellness exam   You can access the FollowMyHealth Patient Portal offered by [facility] by registering at the following website: [email or web address]. By joining [employer]’s FollowMyHealth portal, you will also be able to view your health information using other applications (apps) compatible with our system. You can access the FollowMyHealth Patient Portal offered by [facility] by registering at the following website: [email or web address]. By joining [employer]’s FollowMyHealth portal, you will also be able to view your health information using other applications (apps) compatible with our system.

## 2025-07-25 DIAGNOSIS — E11.9 DIABETES MELLITUS TYPE 2, NONINSULIN DEPENDENT: ICD-10-CM

## 2025-07-25 DIAGNOSIS — M17.0 PRIMARY OSTEOARTHRITIS OF BOTH KNEES: ICD-10-CM

## 2025-07-26 RX ORDER — MELOXICAM 7.5 MG/1
7.5 TABLET ORAL DAILY
Qty: 30 TABLET | Refills: 11 | Status: SHIPPED | OUTPATIENT
Start: 2025-07-26

## 2025-07-26 RX ORDER — PIOGLITAZONE 45 MG/1
45 TABLET ORAL DAILY
Qty: 30 TABLET | Refills: 11 | Status: SHIPPED | OUTPATIENT
Start: 2025-07-26

## 2025-07-26 RX ORDER — TRAMADOL HYDROCHLORIDE 50 MG/1
TABLET ORAL
Qty: 30 TABLET | Refills: 0 | Status: SHIPPED | OUTPATIENT
Start: 2025-07-26

## 2025-08-11 ENCOUNTER — OFFICE VISIT (OUTPATIENT)
Dept: ORTHOPEDIC SURGERY | Facility: CLINIC | Age: 75
End: 2025-08-11
Payer: MEDICARE

## 2025-08-11 VITALS — WEIGHT: 228.3 LBS | TEMPERATURE: 98 F | HEIGHT: 69 IN | BODY MASS INDEX: 33.82 KG/M2

## 2025-08-11 DIAGNOSIS — M17.0 PRIMARY OSTEOARTHRITIS OF BOTH KNEES: Primary | ICD-10-CM

## 2025-08-11 RX ORDER — METHYLPREDNISOLONE ACETATE 80 MG/ML
80 INJECTION, SUSPENSION INTRA-ARTICULAR; INTRALESIONAL; INTRAMUSCULAR; SOFT TISSUE
Status: COMPLETED | OUTPATIENT
Start: 2025-08-11 | End: 2025-08-11

## 2025-08-11 RX ORDER — LIDOCAINE HYDROCHLORIDE 10 MG/ML
2 INJECTION, SOLUTION EPIDURAL; INFILTRATION; INTRACAUDAL; PERINEURAL
Status: COMPLETED | OUTPATIENT
Start: 2025-08-11 | End: 2025-08-11

## 2025-08-11 RX ORDER — ACETAMINOPHEN 500 MG
500 TABLET ORAL EVERY 6 HOURS PRN
COMMUNITY

## 2025-08-11 RX ADMIN — METHYLPREDNISOLONE ACETATE 80 MG: 80 INJECTION, SUSPENSION INTRA-ARTICULAR; INTRALESIONAL; INTRAMUSCULAR; SOFT TISSUE at 12:57

## 2025-08-11 RX ADMIN — LIDOCAINE HYDROCHLORIDE 2 ML: 10 INJECTION, SOLUTION EPIDURAL; INFILTRATION; INTRACAUDAL; PERINEURAL at 12:58

## 2025-08-11 RX ADMIN — LIDOCAINE HYDROCHLORIDE 2 ML: 10 INJECTION, SOLUTION EPIDURAL; INFILTRATION; INTRACAUDAL; PERINEURAL at 12:57

## 2025-08-11 RX ADMIN — METHYLPREDNISOLONE ACETATE 80 MG: 80 INJECTION, SUSPENSION INTRA-ARTICULAR; INTRALESIONAL; INTRAMUSCULAR; SOFT TISSUE at 12:58

## (undated) DEVICE — CANN NASL CO2 TRULINK W/O2 A/

## (undated) DEVICE — Device: Brand: DEFENDO AIR/WATER/SUCTION AND BIOPSY VALVE

## (undated) DEVICE — SENSR O2 OXIMAX FNGR A/ 18IN NONSTR

## (undated) DEVICE — THE TORRENT IRRIGATION SCOPE CONNECTOR IS USED WITH THE TORRENT IRRIGATION TUBING TO PROVIDE IRRIGATION FLUIDS SUCH AS STERILE WATER DURING GASTROINTESTINAL ENDOSCOPIC PROCEDURES WHEN USED IN CONJUNCTION WITH AN IRRIGATION PUMP (OR ELECTROSURGICAL UNIT).: Brand: TORRENT

## (undated) DEVICE — TUBING, SUCTION, 1/4" X 10', STRAIGHT: Brand: MEDLINE

## (undated) DEVICE — BITEBLOCK OMNI BLOC

## (undated) DEVICE — FRCP BX RADJAW4 NDL 2.8 240CM LG OG BX40